# Patient Record
Sex: FEMALE | Race: WHITE | NOT HISPANIC OR LATINO | Employment: OTHER | URBAN - METROPOLITAN AREA
[De-identification: names, ages, dates, MRNs, and addresses within clinical notes are randomized per-mention and may not be internally consistent; named-entity substitution may affect disease eponyms.]

---

## 2017-01-11 ENCOUNTER — TELEPHONE (OUTPATIENT)
Dept: ORTHOPEDIC SURGERY | Facility: CLINIC | Age: 67
End: 2017-01-11

## 2017-01-12 NOTE — TELEPHONE ENCOUNTER
We do not do stem cell procedure here.  She would need to speak with a specialist that performs that procedure to see if she is a candidate

## 2017-06-21 ENCOUNTER — TELEPHONE (OUTPATIENT)
Dept: OBSTETRICS AND GYNECOLOGY | Age: 67
End: 2017-06-21

## 2017-06-21 NOTE — TELEPHONE ENCOUNTER
Please notify the mammogram is the standard of care and can be done with implants.  Breast ultrasound is not done as a diagnostic test to exclude breast cancer.  If patient completely refuses mammogram breast ultrasound alone can be ordered or it can be ordered in conjunction with mammogram.

## 2017-06-22 NOTE — TELEPHONE ENCOUNTER
Pt notd that she can do Mammo here with Implants. She said she doesn't want to be pressed as hard as the mammo presses due to having implants. She said she would rather have U/S done.

## 2017-06-26 NOTE — TELEPHONE ENCOUNTER
I noticed the pt had a mammo 10/10/2016 at our office so she is not due for any screening until after that date

## 2017-10-20 RX ORDER — ALENDRONATE SODIUM 70 MG/1
TABLET ORAL
Qty: 12 TABLET | Refills: 3 | Status: SHIPPED | OUTPATIENT
Start: 2017-10-20 | End: 2017-10-24 | Stop reason: SDUPTHER

## 2017-10-24 ENCOUNTER — TELEPHONE (OUTPATIENT)
Dept: OBSTETRICS AND GYNECOLOGY | Age: 67
End: 2017-10-24

## 2017-10-24 RX ORDER — ALENDRONATE SODIUM 70 MG/1
70 TABLET ORAL
Qty: 12 TABLET | Refills: 0 | Status: SHIPPED | OUTPATIENT
Start: 2017-10-24 | End: 2017-12-14 | Stop reason: SDUPTHER

## 2017-10-24 NOTE — TELEPHONE ENCOUNTER
Dr DUFF pt needs a refill of fosamax 70 mg sent to her [facility] mail order pharmacy, scheduled appt 12-14-17 with dr duff

## 2017-12-14 ENCOUNTER — OFFICE VISIT (OUTPATIENT)
Dept: OBSTETRICS AND GYNECOLOGY | Age: 67
End: 2017-12-14

## 2017-12-14 VITALS
SYSTOLIC BLOOD PRESSURE: 130 MMHG | DIASTOLIC BLOOD PRESSURE: 84 MMHG | HEIGHT: 61 IN | BODY MASS INDEX: 29.83 KG/M2 | WEIGHT: 158 LBS

## 2017-12-14 DIAGNOSIS — M85.80 OSTEOPENIA, UNSPECIFIED LOCATION: ICD-10-CM

## 2017-12-14 DIAGNOSIS — T85.43XD RUPTURE OF IMPLANT OF LEFT BREAST, SUBSEQUENT ENCOUNTER: Primary | ICD-10-CM

## 2017-12-14 DIAGNOSIS — Z01.411 ENCOUNTER FOR GYNECOLOGICAL EXAMINATION WITH ABNORMAL FINDING: ICD-10-CM

## 2017-12-14 PROBLEM — T85.43XA RUPTURE OF IMPLANT OF LEFT BREAST: Status: ACTIVE | Noted: 2017-12-14

## 2017-12-14 PROCEDURE — G0101 CA SCREEN;PELVIC/BREAST EXAM: HCPCS | Performed by: OBSTETRICS & GYNECOLOGY

## 2017-12-14 RX ORDER — UBIDECARENONE 75 MG
50 CAPSULE ORAL
COMMUNITY
End: 2020-06-09

## 2017-12-14 RX ORDER — ALENDRONATE SODIUM 70 MG/1
70 TABLET ORAL
Qty: 12 TABLET | Refills: 3 | Status: SHIPPED | OUTPATIENT
Start: 2017-12-14 | End: 2020-04-08

## 2017-12-14 NOTE — PROGRESS NOTES
Subjective     Chief Complaint   Patient presents with   • Gynecologic Exam     AC       History of Present Illness    Regina Chavez is a 67 y.o.  who presents for annual exam. Patient was found on scant in the past to have a ruptured left breast implant.  Patient was advised to see plastic surgery or breast surgery.  The patient never went and is today refusing a mammogram because she is sure this will worsen the implant rupture.  She may consider going back to see Dr. Thomas    Patient requests an MRI.  She is aware that Medicare does not cover an MRI she may consider paying for herself.    She is doing well on Fosamax for osteopenia.        Obstetric History:  OB History      Para Term  AB Living    2 2 2   2    SAB TAB Ectopic Multiple Live Births        2         Menstrual History:     No LMP recorded. Patient is postmenopausal.         Current contraception: post menopausal status  History of abnormal Pap smear: no  Received Gardasil immunization: no  Perform regular self breast exam: yes  Family history of uterine or ovarian cancer: no  Family History of colon cancer: no  Family history of breast cancer: no    Mammogram: ordered. Pt refused   Colonoscopy: up to date.   DEXA: up to date. Osteopenia on fosamax     Exercise: exercises 7 times a week  Walks a hour   Calcium/Vitamin D: adequate intake    The following portions of the patient's history were reviewed and updated as appropriate: allergies, current medications, past family history, past medical history, past social history, past surgical history and problem list.    Review of Systems    Review of Systems   Constitutional: Negative for fatigue.   Respiratory: Negative for shortness of breath.    Gastrointestinal: Negative for abdominal pain.   Genitourinary: Negative for dysuria.   Neurological: Negative for headaches.   Psychiatric/Behavioral: Negative for dysphoric mood.         Objective   Physical Exam    /84  Ht  "154.9 cm (61\")  Wt 71.7 kg (158 lb)  BMI 29.85 kg/m2    General:   alert, appears stated age, cooperative and mildly obese   Neck: no asymmetry, masses, or scars and thyroid normal to palpation   Heart: regular rate and rhythm   Lungs: clear to auscultation bilaterally   Abdomen: soft, non-tender, without masses or organomegaly   Breast: inspection negative, no nipple discharge or bleeding, no masses or nodularity palpable   Vulva: normal, Bartholin's, Urethra, [place]'s normal   Vagina: normal mucosa, normal discharge   Cervix: no cervical motion tenderness and no lesions   Uterus: mobile, non-tender, normal shape and consistency   Adnexa: no mass, fullness, tenderness   Rectal: normal rectal, no masses     Assessment/Plan   [place] was seen today for gynecologic exam.    Diagnoses and all orders for this visit:    Rupture of implant of left breast, subsequent encounter  -     Ambulatory Referral to Plastic Surgery    Osteopenia, unspecified location    Encounter for gynecological examination with abnormal finding    Other orders  -     alendronate (FOSAMAX) 70 MG tablet; Take 1 tablet by mouth Every 7 (Seven) Days.    I ties the patient that I recommend she see a plastic surgeon to have the implant removed.  She seems very ambivalent about this.  I did refer her to a plastic surgeon.  I ordered an MRI but I do not think it will go through due to Medicare.  I encouraged the patient to have a mammogram.  She declined.  All questions answered.  Breast self exam technique reviewed and patient encouraged to perform self-exam monthly.  Discussed healthy lifestyle modifications.  Recommended 30 minutes of aerobic exercise five times per week.  Discussed calcium needs to prevent osteoporosis.                 "

## 2017-12-15 ENCOUNTER — TELEPHONE (OUTPATIENT)
Dept: OBSTETRICS AND GYNECOLOGY | Age: 67
End: 2017-12-15

## 2017-12-15 NOTE — TELEPHONE ENCOUNTER
Dr DUFF pt  (who is aware will not see msg til Monday)  had a dexa scan last year pt stated she had osteopenia, does Dr DUFF want her to have dexa this year?

## 2018-09-26 ENCOUNTER — APPOINTMENT (OUTPATIENT)
Dept: GENERAL RADIOLOGY | Facility: HOSPITAL | Age: 68
End: 2018-09-26

## 2018-09-26 ENCOUNTER — APPOINTMENT (OUTPATIENT)
Dept: CT IMAGING | Facility: HOSPITAL | Age: 68
End: 2018-09-26

## 2018-09-26 ENCOUNTER — HOSPITAL ENCOUNTER (INPATIENT)
Facility: HOSPITAL | Age: 68
LOS: 6 days | Discharge: HOME HEALTH | End: 2018-10-02
Attending: EMERGENCY MEDICINE | Admitting: NEUROLOGICAL SURGERY
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
## 2018-09-26 DIAGNOSIS — R26.2 DIFFICULTY WALKING: ICD-10-CM

## 2018-09-26 DIAGNOSIS — S22.000A COMPRESSION FRACTURE OF BODY OF THORACIC VERTEBRA (HCC): ICD-10-CM

## 2018-09-26 DIAGNOSIS — M85.89 OSTEOPENIA OF MULTIPLE SITES: ICD-10-CM

## 2018-09-26 DIAGNOSIS — S22.080D CLOSED WEDGE COMPRESSION FRACTURE OF TWELFTH THORACIC VERTEBRA WITH ROUTINE HEALING, SUBSEQUENT ENCOUNTER: ICD-10-CM

## 2018-09-26 DIAGNOSIS — S51.812A FOREARM LACERATION, LEFT, INITIAL ENCOUNTER: ICD-10-CM

## 2018-09-26 PROBLEM — S22.009A FRACTURE OF THORACIC SPINE: Status: ACTIVE | Noted: 2018-09-26

## 2018-09-26 PROBLEM — F10.20 ALCOHOL DEPENDENCY: Status: ACTIVE | Noted: 2018-09-26

## 2018-09-26 PROBLEM — S22.089A CLOSED FRACTURE OF TWELFTH THORACIC VERTEBRA: Status: ACTIVE | Noted: 2018-09-26

## 2018-09-26 PROBLEM — E87.6 HYPOKALEMIA: Status: ACTIVE | Noted: 2018-09-26

## 2018-09-26 LAB
ALBUMIN SERPL-MCNC: 3.7 G/DL (ref 3.5–5.2)
ALBUMIN/GLOB SERPL: 1.6 G/DL
ALP SERPL-CCNC: 66 U/L (ref 39–117)
ALT SERPL W P-5'-P-CCNC: 42 U/L (ref 1–33)
ANION GAP SERPL CALCULATED.3IONS-SCNC: 12.4 MMOL/L
ANION GAP SERPL CALCULATED.3IONS-SCNC: 17.8 MMOL/L
APTT PPP: 29 SECONDS (ref 22.7–35.4)
AST SERPL-CCNC: 35 U/L (ref 1–32)
BASOPHILS # BLD AUTO: 0.01 10*3/MM3 (ref 0–0.2)
BASOPHILS NFR BLD AUTO: 0.1 % (ref 0–1.5)
BILIRUB SERPL-MCNC: 0.6 MG/DL (ref 0.1–1.2)
BUN BLD-MCNC: 16 MG/DL (ref 8–23)
BUN BLD-MCNC: 17 MG/DL (ref 8–23)
BUN/CREAT SERPL: 22.5 (ref 7–25)
BUN/CREAT SERPL: 23.9 (ref 7–25)
CALCIUM SPEC-SCNC: 8.4 MG/DL (ref 8.6–10.5)
CALCIUM SPEC-SCNC: 8.6 MG/DL (ref 8.6–10.5)
CHLORIDE SERPL-SCNC: 105 MMOL/L (ref 98–107)
CHLORIDE SERPL-SCNC: 109 MMOL/L (ref 98–107)
CO2 SERPL-SCNC: 17.2 MMOL/L (ref 22–29)
CO2 SERPL-SCNC: 24.6 MMOL/L (ref 22–29)
CREAT BLD-MCNC: 0.71 MG/DL (ref 0.57–1)
CREAT BLD-MCNC: 0.71 MG/DL (ref 0.57–1)
DEPRECATED RDW RBC AUTO: 47.2 FL (ref 37–54)
EOSINOPHIL # BLD AUTO: 0 10*3/MM3 (ref 0–0.7)
EOSINOPHIL NFR BLD AUTO: 0 % (ref 0.3–6.2)
ERYTHROCYTE [DISTWIDTH] IN BLOOD BY AUTOMATED COUNT: 13.3 % (ref 11.7–13)
ETHANOL BLD-MCNC: 67 MG/DL (ref 0–10)
ETHANOL UR QL: 0.07 %
GFR SERPL CREATININE-BSD FRML MDRD: 81 ML/MIN/1.73
GFR SERPL CREATININE-BSD FRML MDRD: 81 ML/MIN/1.73
GLOBULIN UR ELPH-MCNC: 2.3 GM/DL
GLUCOSE BLD-MCNC: 86 MG/DL (ref 65–99)
GLUCOSE BLD-MCNC: 98 MG/DL (ref 65–99)
HCT VFR BLD AUTO: 38.8 % (ref 35.6–45.5)
HGB BLD-MCNC: 12.7 G/DL (ref 11.9–15.5)
IMM GRANULOCYTES # BLD: 0.04 10*3/MM3 (ref 0–0.03)
IMM GRANULOCYTES NFR BLD: 0.5 % (ref 0–0.5)
INR PPP: 1.08 (ref 0.9–1.1)
LYMPHOCYTES # BLD AUTO: 0.61 10*3/MM3 (ref 0.9–4.8)
LYMPHOCYTES NFR BLD AUTO: 7.2 % (ref 19.6–45.3)
MCH RBC QN AUTO: 31.9 PG (ref 26.9–32)
MCHC RBC AUTO-ENTMCNC: 32.7 G/DL (ref 32.4–36.3)
MCV RBC AUTO: 97.5 FL (ref 80.5–98.2)
MONOCYTES # BLD AUTO: 0.54 10*3/MM3 (ref 0.2–1.2)
MONOCYTES NFR BLD AUTO: 6.4 % (ref 5–12)
NEUTROPHILS # BLD AUTO: 7.27 10*3/MM3 (ref 1.9–8.1)
NEUTROPHILS NFR BLD AUTO: 85.8 % (ref 42.7–76)
PLATELET # BLD AUTO: 168 10*3/MM3 (ref 140–500)
PMV BLD AUTO: 10.1 FL (ref 6–12)
POTASSIUM BLD-SCNC: 3.1 MMOL/L (ref 3.5–5.2)
POTASSIUM BLD-SCNC: 3.5 MMOL/L (ref 3.5–5.2)
PROT SERPL-MCNC: 6 G/DL (ref 6–8.5)
PROTHROMBIN TIME: 13.8 SECONDS (ref 11.7–14.2)
RBC # BLD AUTO: 3.98 10*6/MM3 (ref 3.9–5.2)
SODIUM BLD-SCNC: 142 MMOL/L (ref 136–145)
SODIUM BLD-SCNC: 144 MMOL/L (ref 136–145)
WBC NRBC COR # BLD: 8.47 10*3/MM3 (ref 4.5–10.7)

## 2018-09-26 PROCEDURE — 80307 DRUG TEST PRSMV CHEM ANLYZR: CPT | Performed by: EMERGENCY MEDICINE

## 2018-09-26 PROCEDURE — 70450 CT HEAD/BRAIN W/O DYE: CPT

## 2018-09-26 PROCEDURE — 87186 SC STD MICRODIL/AGAR DIL: CPT | Performed by: INTERNAL MEDICINE

## 2018-09-26 PROCEDURE — 80053 COMPREHEN METABOLIC PANEL: CPT | Performed by: EMERGENCY MEDICINE

## 2018-09-26 PROCEDURE — 87088 URINE BACTERIA CULTURE: CPT | Performed by: INTERNAL MEDICINE

## 2018-09-26 PROCEDURE — 87086 URINE CULTURE/COLONY COUNT: CPT | Performed by: INTERNAL MEDICINE

## 2018-09-26 PROCEDURE — 85610 PROTHROMBIN TIME: CPT | Performed by: NEUROLOGICAL SURGERY

## 2018-09-26 PROCEDURE — 72110 X-RAY EXAM L-2 SPINE 4/>VWS: CPT

## 2018-09-26 PROCEDURE — 25010000002 ONDANSETRON PER 1 MG: Performed by: EMERGENCY MEDICINE

## 2018-09-26 PROCEDURE — 81001 URINALYSIS AUTO W/SCOPE: CPT | Performed by: INTERNAL MEDICINE

## 2018-09-26 PROCEDURE — 25010000002 HYDROMORPHONE PER 4 MG: Performed by: EMERGENCY MEDICINE

## 2018-09-26 PROCEDURE — 72170 X-RAY EXAM OF PELVIS: CPT

## 2018-09-26 PROCEDURE — 25010000002 THIAMINE PER 100 MG: Performed by: INTERNAL MEDICINE

## 2018-09-26 PROCEDURE — 99285 EMERGENCY DEPT VISIT HI MDM: CPT

## 2018-09-26 PROCEDURE — 72072 X-RAY EXAM THORAC SPINE 3VWS: CPT

## 2018-09-26 PROCEDURE — 0HQEXZZ REPAIR LEFT LOWER ARM SKIN, EXTERNAL APPROACH: ICD-10-PCS | Performed by: EMERGENCY MEDICINE

## 2018-09-26 PROCEDURE — 90791 PSYCH DIAGNOSTIC EVALUATION: CPT | Performed by: SOCIAL WORKER

## 2018-09-26 PROCEDURE — 85025 COMPLETE CBC W/AUTO DIFF WBC: CPT | Performed by: EMERGENCY MEDICINE

## 2018-09-26 PROCEDURE — 25010000002 HYDROMORPHONE PER 4 MG: Performed by: INTERNAL MEDICINE

## 2018-09-26 PROCEDURE — 71045 X-RAY EXAM CHEST 1 VIEW: CPT

## 2018-09-26 PROCEDURE — 25010000002 KETOROLAC TROMETHAMINE PER 15 MG: Performed by: INTERNAL MEDICINE

## 2018-09-26 PROCEDURE — 85730 THROMBOPLASTIN TIME PARTIAL: CPT | Performed by: NEUROLOGICAL SURGERY

## 2018-09-26 PROCEDURE — 99204 OFFICE O/P NEW MOD 45 MIN: CPT | Performed by: NURSE PRACTITIONER

## 2018-09-26 RX ORDER — LOSARTAN POTASSIUM 100 MG/1
100 TABLET ORAL DAILY
Status: DISCONTINUED | OUTPATIENT
Start: 2018-09-26 | End: 2018-10-02 | Stop reason: HOSPADM

## 2018-09-26 RX ORDER — LIDOCAINE HYDROCHLORIDE AND EPINEPHRINE 10; 10 MG/ML; UG/ML
10 INJECTION, SOLUTION INFILTRATION; PERINEURAL ONCE
Status: DISCONTINUED | OUTPATIENT
Start: 2018-09-26 | End: 2018-10-02

## 2018-09-26 RX ORDER — SODIUM CHLORIDE 0.9 % (FLUSH) 0.9 %
10 SYRINGE (ML) INJECTION AS NEEDED
Status: DISCONTINUED | OUTPATIENT
Start: 2018-09-26 | End: 2018-10-02 | Stop reason: HOSPADM

## 2018-09-26 RX ORDER — ONDANSETRON 4 MG/1
4 TABLET, ORALLY DISINTEGRATING ORAL ONCE
Status: DISCONTINUED | OUTPATIENT
Start: 2018-09-26 | End: 2018-09-26

## 2018-09-26 RX ORDER — LORAZEPAM 2 MG/ML
0.5 INJECTION INTRAMUSCULAR EVERY 6 HOURS
Status: DISPENSED | OUTPATIENT
Start: 2018-09-26 | End: 2018-09-27

## 2018-09-26 RX ORDER — POTASSIUM CHLORIDE 750 MG/1
40 CAPSULE, EXTENDED RELEASE ORAL AS NEEDED
Status: DISCONTINUED | OUTPATIENT
Start: 2018-09-26 | End: 2018-10-02 | Stop reason: HOSPADM

## 2018-09-26 RX ORDER — CEFAZOLIN SODIUM 2 G/100ML
2 INJECTION, SOLUTION INTRAVENOUS ONCE
Status: CANCELLED | OUTPATIENT
Start: 2018-09-27 | End: 2018-09-27

## 2018-09-26 RX ORDER — POTASSIUM CHLORIDE 1.5 G/1.77G
40 POWDER, FOR SOLUTION ORAL AS NEEDED
Status: DISCONTINUED | OUTPATIENT
Start: 2018-09-26 | End: 2018-10-02 | Stop reason: HOSPADM

## 2018-09-26 RX ORDER — DOCUSATE SODIUM 100 MG/1
100 CAPSULE, LIQUID FILLED ORAL 2 TIMES DAILY
Status: DISCONTINUED | OUTPATIENT
Start: 2018-09-26 | End: 2018-10-02 | Stop reason: HOSPADM

## 2018-09-26 RX ORDER — KETOROLAC TROMETHAMINE 30 MG/ML
30 INJECTION, SOLUTION INTRAMUSCULAR; INTRAVENOUS EVERY 4 HOURS PRN
Status: DISCONTINUED | OUTPATIENT
Start: 2018-09-26 | End: 2018-09-30

## 2018-09-26 RX ORDER — ONDANSETRON 4 MG/1
4 TABLET, FILM COATED ORAL EVERY 6 HOURS PRN
Status: DISCONTINUED | OUTPATIENT
Start: 2018-09-26 | End: 2018-10-02 | Stop reason: HOSPADM

## 2018-09-26 RX ORDER — SODIUM CHLORIDE 0.9 % (FLUSH) 0.9 %
1-10 SYRINGE (ML) INJECTION AS NEEDED
Status: CANCELLED | OUTPATIENT
Start: 2018-09-27

## 2018-09-26 RX ORDER — SODIUM CHLORIDE 9 MG/ML
100 INJECTION, SOLUTION INTRAVENOUS CONTINUOUS
Status: DISCONTINUED | OUTPATIENT
Start: 2018-09-26 | End: 2018-09-26

## 2018-09-26 RX ORDER — CALCIUM CARBONATE 500(1250)
500 TABLET ORAL DAILY
Status: DISCONTINUED | OUTPATIENT
Start: 2018-09-27 | End: 2018-10-02 | Stop reason: HOSPADM

## 2018-09-26 RX ORDER — ONDANSETRON 2 MG/ML
4 INJECTION INTRAMUSCULAR; INTRAVENOUS ONCE
Status: COMPLETED | OUTPATIENT
Start: 2018-09-26 | End: 2018-09-26

## 2018-09-26 RX ORDER — ONDANSETRON 4 MG/1
4 TABLET, ORALLY DISINTEGRATING ORAL EVERY 6 HOURS PRN
Status: DISCONTINUED | OUTPATIENT
Start: 2018-09-26 | End: 2018-10-02 | Stop reason: HOSPADM

## 2018-09-26 RX ORDER — SODIUM CHLORIDE 0.9 % (FLUSH) 0.9 %
1-10 SYRINGE (ML) INJECTION AS NEEDED
Status: DISCONTINUED | OUTPATIENT
Start: 2018-09-26 | End: 2018-10-02 | Stop reason: HOSPADM

## 2018-09-26 RX ORDER — AMLODIPINE BESYLATE 5 MG/1
5 TABLET ORAL DAILY
Status: DISCONTINUED | OUTPATIENT
Start: 2018-09-26 | End: 2018-10-02 | Stop reason: HOSPADM

## 2018-09-26 RX ORDER — ONDANSETRON 2 MG/ML
4 INJECTION INTRAMUSCULAR; INTRAVENOUS EVERY 6 HOURS PRN
Status: DISCONTINUED | OUTPATIENT
Start: 2018-09-26 | End: 2018-10-02 | Stop reason: HOSPADM

## 2018-09-26 RX ORDER — UREA 10 %
1250 LOTION (ML) TOPICAL DAILY
Status: DISCONTINUED | OUTPATIENT
Start: 2018-09-26 | End: 2018-09-26 | Stop reason: CLARIF

## 2018-09-26 RX ORDER — LORAZEPAM 2 MG/ML
0.5 INJECTION INTRAMUSCULAR EVERY 8 HOURS
Status: DISCONTINUED | OUTPATIENT
Start: 2018-09-27 | End: 2018-10-02 | Stop reason: HOSPADM

## 2018-09-26 RX ORDER — NALOXONE HCL 0.4 MG/ML
0.4 VIAL (ML) INJECTION
Status: DISCONTINUED | OUTPATIENT
Start: 2018-09-26 | End: 2018-10-02 | Stop reason: HOSPADM

## 2018-09-26 RX ADMIN — FOLIC ACID 100 ML/HR: 5 INJECTION, SOLUTION INTRAMUSCULAR; INTRAVENOUS; SUBCUTANEOUS at 20:47

## 2018-09-26 RX ADMIN — HYDROMORPHONE HYDROCHLORIDE 0.5 MG: 1 INJECTION, SOLUTION INTRAMUSCULAR; INTRAVENOUS; SUBCUTANEOUS at 09:48

## 2018-09-26 RX ADMIN — Medication 10 ML: at 09:48

## 2018-09-26 RX ADMIN — HYDROMORPHONE HYDROCHLORIDE 1 MG: 1 INJECTION, SOLUTION INTRAMUSCULAR; INTRAVENOUS; SUBCUTANEOUS at 23:10

## 2018-09-26 RX ADMIN — LOSARTAN POTASSIUM 100 MG: 100 TABLET, FILM COATED ORAL at 21:30

## 2018-09-26 RX ADMIN — HYDROMORPHONE HYDROCHLORIDE 1 MG: 1 INJECTION, SOLUTION INTRAMUSCULAR; INTRAVENOUS; SUBCUTANEOUS at 18:25

## 2018-09-26 RX ADMIN — SODIUM CHLORIDE 100 ML/HR: 9 INJECTION, SOLUTION INTRAVENOUS at 14:38

## 2018-09-26 RX ADMIN — ONDANSETRON 4 MG: 2 INJECTION INTRAMUSCULAR; INTRAVENOUS at 09:48

## 2018-09-26 RX ADMIN — KETOROLAC TROMETHAMINE 30 MG: 30 INJECTION, SOLUTION INTRAMUSCULAR at 14:38

## 2018-09-26 RX ADMIN — HYDROMORPHONE HYDROCHLORIDE 0.5 MG: 1 INJECTION, SOLUTION INTRAMUSCULAR; INTRAVENOUS; SUBCUTANEOUS at 10:30

## 2018-09-26 RX ADMIN — AMLODIPINE BESYLATE 5 MG: 5 TABLET ORAL at 21:30

## 2018-09-26 RX ADMIN — DOCUSATE SODIUM 100 MG: 100 CAPSULE, LIQUID FILLED ORAL at 21:30

## 2018-09-27 PROBLEM — N39.0 UTI (URINARY TRACT INFECTION): Status: ACTIVE | Noted: 2018-09-27

## 2018-09-27 LAB
ANION GAP SERPL CALCULATED.3IONS-SCNC: 10.4 MMOL/L
BACTERIA UR QL AUTO: ABNORMAL /HPF
BACTERIA UR QL AUTO: ABNORMAL /HPF
BILIRUB UR QL STRIP: NEGATIVE
BILIRUB UR QL STRIP: NEGATIVE
BUN BLD-MCNC: 12 MG/DL (ref 8–23)
BUN/CREAT SERPL: 19 (ref 7–25)
CALCIUM SPEC-SCNC: 8.7 MG/DL (ref 8.6–10.5)
CHLORIDE SERPL-SCNC: 107 MMOL/L (ref 98–107)
CLARITY UR: ABNORMAL
CLARITY UR: ABNORMAL
CO2 SERPL-SCNC: 21.6 MMOL/L (ref 22–29)
COLOR UR: YELLOW
COLOR UR: YELLOW
CREAT BLD-MCNC: 0.63 MG/DL (ref 0.57–1)
GFR SERPL CREATININE-BSD FRML MDRD: 93 ML/MIN/1.73
GLUCOSE BLD-MCNC: 109 MG/DL (ref 65–99)
GLUCOSE UR STRIP-MCNC: NEGATIVE MG/DL
GLUCOSE UR STRIP-MCNC: NEGATIVE MG/DL
HGB UR QL STRIP.AUTO: ABNORMAL
HGB UR QL STRIP.AUTO: ABNORMAL
HYALINE CASTS UR QL AUTO: ABNORMAL /LPF
HYALINE CASTS UR QL AUTO: ABNORMAL /LPF
INR PPP: 1.03 (ref 0.9–1.1)
KETONES UR QL STRIP: NEGATIVE
KETONES UR QL STRIP: NEGATIVE
LEUKOCYTE ESTERASE UR QL STRIP.AUTO: ABNORMAL
LEUKOCYTE ESTERASE UR QL STRIP.AUTO: ABNORMAL
NITRITE UR QL STRIP: POSITIVE
NITRITE UR QL STRIP: POSITIVE
PH UR STRIP.AUTO: 7 [PH] (ref 5–8)
PH UR STRIP.AUTO: 7 [PH] (ref 5–8)
POTASSIUM BLD-SCNC: 3.8 MMOL/L (ref 3.5–5.2)
PROT UR QL STRIP: ABNORMAL
PROT UR QL STRIP: ABNORMAL
PROTHROMBIN TIME: 13.3 SECONDS (ref 11.7–14.2)
RBC # UR: ABNORMAL /HPF
RBC # UR: ABNORMAL /HPF
REF LAB TEST METHOD: ABNORMAL
REF LAB TEST METHOD: ABNORMAL
SODIUM BLD-SCNC: 139 MMOL/L (ref 136–145)
SP GR UR STRIP: 1.02 (ref 1–1.03)
SP GR UR STRIP: 1.02 (ref 1–1.03)
SQUAMOUS #/AREA URNS HPF: ABNORMAL /HPF
SQUAMOUS #/AREA URNS HPF: ABNORMAL /HPF
UROBILINOGEN UR QL STRIP: ABNORMAL
UROBILINOGEN UR QL STRIP: ABNORMAL
WBC UR QL AUTO: ABNORMAL /HPF
WBC UR QL AUTO: ABNORMAL /HPF

## 2018-09-27 PROCEDURE — 80048 BASIC METABOLIC PNL TOTAL CA: CPT | Performed by: INTERNAL MEDICINE

## 2018-09-27 PROCEDURE — 25010000002 LORAZEPAM PER 2 MG: Performed by: INTERNAL MEDICINE

## 2018-09-27 PROCEDURE — 25010000002 KETOROLAC TROMETHAMINE PER 15 MG: Performed by: INTERNAL MEDICINE

## 2018-09-27 PROCEDURE — 25010000002 HYDROMORPHONE PER 4 MG: Performed by: INTERNAL MEDICINE

## 2018-09-27 PROCEDURE — 25010000002 CEFTRIAXONE PER 250 MG: Performed by: NURSE PRACTITIONER

## 2018-09-27 PROCEDURE — 25010000002 THIAMINE PER 100 MG: Performed by: INTERNAL MEDICINE

## 2018-09-27 PROCEDURE — 81001 URINALYSIS AUTO W/SCOPE: CPT | Performed by: HOSPITALIST

## 2018-09-27 PROCEDURE — 85610 PROTHROMBIN TIME: CPT | Performed by: NURSE PRACTITIONER

## 2018-09-27 RX ORDER — METHOCARBAMOL 500 MG/1
500 TABLET, FILM COATED ORAL EVERY 6 HOURS PRN
Status: DISCONTINUED | OUTPATIENT
Start: 2018-09-27 | End: 2018-10-02 | Stop reason: HOSPADM

## 2018-09-27 RX ORDER — HYDROMORPHONE HYDROCHLORIDE 1 MG/ML
0.5 INJECTION, SOLUTION INTRAMUSCULAR; INTRAVENOUS; SUBCUTANEOUS EVERY 4 HOURS PRN
Status: DISCONTINUED | OUTPATIENT
Start: 2018-09-27 | End: 2018-10-02 | Stop reason: HOSPADM

## 2018-09-27 RX ORDER — HYDROCODONE BITARTRATE AND ACETAMINOPHEN 7.5; 325 MG/1; MG/1
1 TABLET ORAL EVERY 4 HOURS PRN
Status: DISCONTINUED | OUTPATIENT
Start: 2018-09-27 | End: 2018-10-02 | Stop reason: HOSPADM

## 2018-09-27 RX ORDER — CEFTRIAXONE SODIUM 1 G/50ML
1 INJECTION, SOLUTION INTRAVENOUS EVERY 24 HOURS
Status: DISCONTINUED | OUTPATIENT
Start: 2018-09-27 | End: 2018-10-02

## 2018-09-27 RX ADMIN — DOCUSATE SODIUM 100 MG: 100 CAPSULE, LIQUID FILLED ORAL at 20:15

## 2018-09-27 RX ADMIN — KETOROLAC TROMETHAMINE 30 MG: 30 INJECTION, SOLUTION INTRAMUSCULAR at 20:14

## 2018-09-27 RX ADMIN — LORAZEPAM 0.5 MG: 2 INJECTION INTRAMUSCULAR; INTRAVENOUS at 00:51

## 2018-09-27 RX ADMIN — HYDROMORPHONE HYDROCHLORIDE 1 MG: 1 INJECTION, SOLUTION INTRAMUSCULAR; INTRAVENOUS; SUBCUTANEOUS at 03:17

## 2018-09-27 RX ADMIN — HYDROCODONE BITARTRATE AND ACETAMINOPHEN 1 TABLET: 7.5; 325 TABLET ORAL at 15:10

## 2018-09-27 RX ADMIN — HYDROMORPHONE HYDROCHLORIDE 1 MG: 1 INJECTION, SOLUTION INTRAMUSCULAR; INTRAVENOUS; SUBCUTANEOUS at 07:40

## 2018-09-27 RX ADMIN — METHOCARBAMOL 500 MG: 500 TABLET ORAL at 17:32

## 2018-09-27 RX ADMIN — CALCIUM 500 MG: 500 TABLET ORAL at 09:25

## 2018-09-27 RX ADMIN — FOLIC ACID 100 ML/HR: 5 INJECTION, SOLUTION INTRAMUSCULAR; INTRAVENOUS; SUBCUTANEOUS at 07:40

## 2018-09-27 RX ADMIN — HYDROMORPHONE HYDROCHLORIDE 1 MG: 1 INJECTION, SOLUTION INTRAMUSCULAR; INTRAVENOUS; SUBCUTANEOUS at 12:06

## 2018-09-27 RX ADMIN — LOSARTAN POTASSIUM 100 MG: 100 TABLET, FILM COATED ORAL at 09:24

## 2018-09-27 RX ADMIN — FOLIC ACID 100 ML/HR: 5 INJECTION, SOLUTION INTRAMUSCULAR; INTRAVENOUS; SUBCUTANEOUS at 20:14

## 2018-09-27 RX ADMIN — LORAZEPAM 0.5 MG: 2 INJECTION INTRAMUSCULAR; INTRAVENOUS at 06:13

## 2018-09-27 RX ADMIN — CEFTRIAXONE SODIUM 1 G: 1 INJECTION, SOLUTION INTRAVENOUS at 05:36

## 2018-09-27 RX ADMIN — AMLODIPINE BESYLATE 5 MG: 5 TABLET ORAL at 09:24

## 2018-09-27 RX ADMIN — LORAZEPAM 0.5 MG: 2 INJECTION INTRAMUSCULAR; INTRAVENOUS at 23:51

## 2018-09-27 RX ADMIN — LORAZEPAM 0.5 MG: 2 INJECTION INTRAMUSCULAR; INTRAVENOUS at 15:10

## 2018-09-27 RX ADMIN — DOCUSATE SODIUM 100 MG: 100 CAPSULE, LIQUID FILLED ORAL at 09:25

## 2018-09-28 LAB
ANION GAP SERPL CALCULATED.3IONS-SCNC: 8.1 MMOL/L
BUN BLD-MCNC: 10 MG/DL (ref 8–23)
BUN/CREAT SERPL: 16.9 (ref 7–25)
CALCIUM SPEC-SCNC: 8.9 MG/DL (ref 8.6–10.5)
CHLORIDE SERPL-SCNC: 107 MMOL/L (ref 98–107)
CO2 SERPL-SCNC: 23.9 MMOL/L (ref 22–29)
CREAT BLD-MCNC: 0.59 MG/DL (ref 0.57–1)
GFR SERPL CREATININE-BSD FRML MDRD: 101 ML/MIN/1.73
GLUCOSE BLD-MCNC: 107 MG/DL (ref 65–99)
POTASSIUM BLD-SCNC: 3.7 MMOL/L (ref 3.5–5.2)
SODIUM BLD-SCNC: 139 MMOL/L (ref 136–145)

## 2018-09-28 PROCEDURE — 25010000002 KETOROLAC TROMETHAMINE PER 15 MG: Performed by: INTERNAL MEDICINE

## 2018-09-28 PROCEDURE — 97161 PT EVAL LOW COMPLEX 20 MIN: CPT

## 2018-09-28 PROCEDURE — 25010000002 LORAZEPAM PER 2 MG: Performed by: INTERNAL MEDICINE

## 2018-09-28 PROCEDURE — 25010000002 CEFTRIAXONE PER 250 MG: Performed by: NURSE PRACTITIONER

## 2018-09-28 PROCEDURE — 97110 THERAPEUTIC EXERCISES: CPT

## 2018-09-28 PROCEDURE — 99231 SBSQ HOSP IP/OBS SF/LOW 25: CPT | Performed by: NURSE PRACTITIONER

## 2018-09-28 PROCEDURE — 80048 BASIC METABOLIC PNL TOTAL CA: CPT | Performed by: INTERNAL MEDICINE

## 2018-09-28 RX ADMIN — HYDROCODONE BITARTRATE AND ACETAMINOPHEN 1 TABLET: 7.5; 325 TABLET ORAL at 11:16

## 2018-09-28 RX ADMIN — DOCUSATE SODIUM 100 MG: 100 CAPSULE, LIQUID FILLED ORAL at 20:47

## 2018-09-28 RX ADMIN — HYDROCODONE BITARTRATE AND ACETAMINOPHEN 1 TABLET: 7.5; 325 TABLET ORAL at 15:34

## 2018-09-28 RX ADMIN — LOSARTAN POTASSIUM 100 MG: 100 TABLET, FILM COATED ORAL at 09:14

## 2018-09-28 RX ADMIN — HYDROCODONE BITARTRATE AND ACETAMINOPHEN 1 TABLET: 7.5; 325 TABLET ORAL at 05:51

## 2018-09-28 RX ADMIN — LORAZEPAM 0.5 MG: 2 INJECTION INTRAMUSCULAR; INTRAVENOUS at 09:14

## 2018-09-28 RX ADMIN — METHOCARBAMOL 500 MG: 500 TABLET ORAL at 23:06

## 2018-09-28 RX ADMIN — HYDROCODONE BITARTRATE AND ACETAMINOPHEN 1 TABLET: 7.5; 325 TABLET ORAL at 20:47

## 2018-09-28 RX ADMIN — DOCUSATE SODIUM 100 MG: 100 CAPSULE, LIQUID FILLED ORAL at 09:14

## 2018-09-28 RX ADMIN — KETOROLAC TROMETHAMINE 30 MG: 30 INJECTION, SOLUTION INTRAMUSCULAR at 08:11

## 2018-09-28 RX ADMIN — LORAZEPAM 0.5 MG: 2 INJECTION INTRAMUSCULAR; INTRAVENOUS at 23:06

## 2018-09-28 RX ADMIN — AMLODIPINE BESYLATE 5 MG: 5 TABLET ORAL at 09:14

## 2018-09-28 RX ADMIN — CEFTRIAXONE SODIUM 1 G: 1 INJECTION, SOLUTION INTRAVENOUS at 05:52

## 2018-09-28 RX ADMIN — CALCIUM 500 MG: 500 TABLET ORAL at 09:14

## 2018-09-29 LAB
ANION GAP SERPL CALCULATED.3IONS-SCNC: 9.2 MMOL/L
BACTERIA SPEC AEROBE CULT: ABNORMAL
BUN BLD-MCNC: 13 MG/DL (ref 8–23)
BUN/CREAT SERPL: 20.6 (ref 7–25)
CALCIUM SPEC-SCNC: 9.4 MG/DL (ref 8.6–10.5)
CHLORIDE SERPL-SCNC: 108 MMOL/L (ref 98–107)
CO2 SERPL-SCNC: 22.8 MMOL/L (ref 22–29)
CREAT BLD-MCNC: 0.63 MG/DL (ref 0.57–1)
GFR SERPL CREATININE-BSD FRML MDRD: 93 ML/MIN/1.73
GLUCOSE BLD-MCNC: 103 MG/DL (ref 65–99)
POTASSIUM BLD-SCNC: 3.6 MMOL/L (ref 3.5–5.2)
SODIUM BLD-SCNC: 140 MMOL/L (ref 136–145)

## 2018-09-29 PROCEDURE — 97110 THERAPEUTIC EXERCISES: CPT

## 2018-09-29 PROCEDURE — 80048 BASIC METABOLIC PNL TOTAL CA: CPT | Performed by: INTERNAL MEDICINE

## 2018-09-29 PROCEDURE — 25010000002 CEFTRIAXONE PER 250 MG: Performed by: NURSE PRACTITIONER

## 2018-09-29 PROCEDURE — 25010000002 LORAZEPAM PER 2 MG: Performed by: INTERNAL MEDICINE

## 2018-09-29 PROCEDURE — 99231 SBSQ HOSP IP/OBS SF/LOW 25: CPT | Performed by: NEUROLOGICAL SURGERY

## 2018-09-29 RX ADMIN — METHOCARBAMOL 500 MG: 500 TABLET ORAL at 16:49

## 2018-09-29 RX ADMIN — CEFTRIAXONE SODIUM 1 G: 1 INJECTION, SOLUTION INTRAVENOUS at 05:36

## 2018-09-29 RX ADMIN — DOCUSATE SODIUM 100 MG: 100 CAPSULE, LIQUID FILLED ORAL at 09:59

## 2018-09-29 RX ADMIN — HYDROCODONE BITARTRATE AND ACETAMINOPHEN 1 TABLET: 7.5; 325 TABLET ORAL at 21:37

## 2018-09-29 RX ADMIN — METHOCARBAMOL 500 MG: 500 TABLET ORAL at 09:59

## 2018-09-29 RX ADMIN — HYDROCODONE BITARTRATE AND ACETAMINOPHEN 1 TABLET: 7.5; 325 TABLET ORAL at 09:59

## 2018-09-29 RX ADMIN — HYDROCODONE BITARTRATE AND ACETAMINOPHEN 1 TABLET: 7.5; 325 TABLET ORAL at 13:53

## 2018-09-29 RX ADMIN — LORAZEPAM 0.5 MG: 2 INJECTION INTRAMUSCULAR; INTRAVENOUS at 05:36

## 2018-09-29 RX ADMIN — LOSARTAN POTASSIUM 100 MG: 100 TABLET, FILM COATED ORAL at 09:59

## 2018-09-29 RX ADMIN — DOCUSATE SODIUM 100 MG: 100 CAPSULE, LIQUID FILLED ORAL at 21:37

## 2018-09-29 RX ADMIN — HYDROCODONE BITARTRATE AND ACETAMINOPHEN 1 TABLET: 7.5; 325 TABLET ORAL at 05:36

## 2018-09-29 RX ADMIN — AMLODIPINE BESYLATE 5 MG: 5 TABLET ORAL at 09:59

## 2018-09-29 RX ADMIN — CALCIUM 500 MG: 500 TABLET ORAL at 09:59

## 2018-09-29 RX ADMIN — LORAZEPAM 0.5 MG: 2 INJECTION INTRAMUSCULAR; INTRAVENOUS at 21:37

## 2018-09-29 RX ADMIN — LORAZEPAM 0.5 MG: 2 INJECTION INTRAMUSCULAR; INTRAVENOUS at 16:50

## 2018-09-30 LAB
ANION GAP SERPL CALCULATED.3IONS-SCNC: 14.6 MMOL/L
BACTERIA UR QL AUTO: ABNORMAL /HPF
BASOPHILS # BLD AUTO: 0.01 10*3/MM3 (ref 0–0.2)
BASOPHILS NFR BLD AUTO: 0.2 % (ref 0–1.5)
BILIRUB UR QL STRIP: NEGATIVE
BUN BLD-MCNC: 23 MG/DL (ref 8–23)
BUN/CREAT SERPL: 32.9 (ref 7–25)
CALCIUM SPEC-SCNC: 9.4 MG/DL (ref 8.6–10.5)
CHLORIDE SERPL-SCNC: 105 MMOL/L (ref 98–107)
CLARITY UR: ABNORMAL
CO2 SERPL-SCNC: 21.4 MMOL/L (ref 22–29)
COLOR UR: YELLOW
CREAT BLD-MCNC: 0.7 MG/DL (ref 0.57–1)
DEPRECATED RDW RBC AUTO: 46 FL (ref 37–54)
EOSINOPHIL # BLD AUTO: 0.07 10*3/MM3 (ref 0–0.7)
EOSINOPHIL NFR BLD AUTO: 1.3 % (ref 0.3–6.2)
ERYTHROCYTE [DISTWIDTH] IN BLOOD BY AUTOMATED COUNT: 13.1 % (ref 11.7–13)
GFR SERPL CREATININE-BSD FRML MDRD: 83 ML/MIN/1.73
GLUCOSE BLD-MCNC: 113 MG/DL (ref 65–99)
GLUCOSE UR STRIP-MCNC: NEGATIVE MG/DL
HCT VFR BLD AUTO: 43.8 % (ref 35.6–45.5)
HGB BLD-MCNC: 14.6 G/DL (ref 11.9–15.5)
HGB UR QL STRIP.AUTO: NEGATIVE
HYALINE CASTS UR QL AUTO: ABNORMAL /LPF
IMM GRANULOCYTES # BLD: 0.02 10*3/MM3 (ref 0–0.03)
IMM GRANULOCYTES NFR BLD: 0.4 % (ref 0–0.5)
KETONES UR QL STRIP: NEGATIVE
LEUKOCYTE ESTERASE UR QL STRIP.AUTO: ABNORMAL
LYMPHOCYTES # BLD AUTO: 0.83 10*3/MM3 (ref 0.9–4.8)
LYMPHOCYTES NFR BLD AUTO: 15.6 % (ref 19.6–45.3)
MCH RBC QN AUTO: 32.3 PG (ref 26.9–32)
MCHC RBC AUTO-ENTMCNC: 33.3 G/DL (ref 32.4–36.3)
MCV RBC AUTO: 96.9 FL (ref 80.5–98.2)
MONOCYTES # BLD AUTO: 0.47 10*3/MM3 (ref 0.2–1.2)
MONOCYTES NFR BLD AUTO: 8.9 % (ref 5–12)
NEUTROPHILS # BLD AUTO: 3.91 10*3/MM3 (ref 1.9–8.1)
NEUTROPHILS NFR BLD AUTO: 73.6 % (ref 42.7–76)
NITRITE UR QL STRIP: NEGATIVE
PH UR STRIP.AUTO: 5.5 [PH] (ref 5–8)
PLATELET # BLD AUTO: 243 10*3/MM3 (ref 140–500)
PMV BLD AUTO: 10.2 FL (ref 6–12)
POTASSIUM BLD-SCNC: 3.5 MMOL/L (ref 3.5–5.2)
PROT UR QL STRIP: NEGATIVE
RBC # BLD AUTO: 4.52 10*6/MM3 (ref 3.9–5.2)
RBC # UR: ABNORMAL /HPF
REF LAB TEST METHOD: ABNORMAL
SODIUM BLD-SCNC: 141 MMOL/L (ref 136–145)
SP GR UR STRIP: 1.02 (ref 1–1.03)
SQUAMOUS #/AREA URNS HPF: ABNORMAL /HPF
TRANS CELLS #/AREA URNS HPF: ABNORMAL /HPF
UROBILINOGEN UR QL STRIP: ABNORMAL
WBC NRBC COR # BLD: 5.31 10*3/MM3 (ref 4.5–10.7)
WBC UR QL AUTO: ABNORMAL /HPF

## 2018-09-30 PROCEDURE — 97110 THERAPEUTIC EXERCISES: CPT

## 2018-09-30 PROCEDURE — 87086 URINE CULTURE/COLONY COUNT: CPT | Performed by: NURSE PRACTITIONER

## 2018-09-30 PROCEDURE — 80048 BASIC METABOLIC PNL TOTAL CA: CPT | Performed by: INTERNAL MEDICINE

## 2018-09-30 PROCEDURE — 25010000002 LORAZEPAM PER 2 MG: Performed by: INTERNAL MEDICINE

## 2018-09-30 PROCEDURE — 81001 URINALYSIS AUTO W/SCOPE: CPT | Performed by: NURSE PRACTITIONER

## 2018-09-30 PROCEDURE — 25010000002 CEFTRIAXONE PER 250 MG: Performed by: NURSE PRACTITIONER

## 2018-09-30 PROCEDURE — 99231 SBSQ HOSP IP/OBS SF/LOW 25: CPT | Performed by: NEUROLOGICAL SURGERY

## 2018-09-30 PROCEDURE — 85025 COMPLETE CBC W/AUTO DIFF WBC: CPT | Performed by: INTERNAL MEDICINE

## 2018-09-30 RX ORDER — FOLIC ACID 1 MG/1
1 TABLET ORAL DAILY
Status: DISCONTINUED | OUTPATIENT
Start: 2018-09-30 | End: 2018-10-02 | Stop reason: HOSPADM

## 2018-09-30 RX ORDER — THIAMINE MONONITRATE (VIT B1) 100 MG
100 TABLET ORAL DAILY
Status: DISCONTINUED | OUTPATIENT
Start: 2018-09-30 | End: 2018-10-02 | Stop reason: HOSPADM

## 2018-09-30 RX ADMIN — LORAZEPAM 0.5 MG: 2 INJECTION INTRAMUSCULAR; INTRAVENOUS at 22:32

## 2018-09-30 RX ADMIN — LORAZEPAM 0.5 MG: 2 INJECTION INTRAMUSCULAR; INTRAVENOUS at 09:10

## 2018-09-30 RX ADMIN — HYDROCODONE BITARTRATE AND ACETAMINOPHEN 1 TABLET: 7.5; 325 TABLET ORAL at 21:16

## 2018-09-30 RX ADMIN — DOCUSATE SODIUM 100 MG: 100 CAPSULE, LIQUID FILLED ORAL at 21:16

## 2018-09-30 RX ADMIN — FOLIC ACID 1 MG: 1 TABLET ORAL at 14:42

## 2018-09-30 RX ADMIN — LOSARTAN POTASSIUM 100 MG: 100 TABLET, FILM COATED ORAL at 09:09

## 2018-09-30 RX ADMIN — Medication 100 MG: at 14:42

## 2018-09-30 RX ADMIN — HYDROCODONE BITARTRATE AND ACETAMINOPHEN 1 TABLET: 7.5; 325 TABLET ORAL at 15:59

## 2018-09-30 RX ADMIN — CALCIUM 500 MG: 500 TABLET ORAL at 09:09

## 2018-09-30 RX ADMIN — CEFTRIAXONE SODIUM 1 G: 1 INJECTION, SOLUTION INTRAVENOUS at 05:02

## 2018-09-30 RX ADMIN — HYDROCODONE BITARTRATE AND ACETAMINOPHEN 1 TABLET: 7.5; 325 TABLET ORAL at 09:09

## 2018-09-30 RX ADMIN — AMLODIPINE BESYLATE 5 MG: 5 TABLET ORAL at 09:09

## 2018-09-30 RX ADMIN — METHOCARBAMOL 500 MG: 500 TABLET ORAL at 21:16

## 2018-09-30 RX ADMIN — DOCUSATE SODIUM 100 MG: 100 CAPSULE, LIQUID FILLED ORAL at 09:09

## 2018-10-01 ENCOUNTER — APPOINTMENT (OUTPATIENT)
Dept: GENERAL RADIOLOGY | Facility: HOSPITAL | Age: 68
End: 2018-10-01

## 2018-10-01 ENCOUNTER — ANESTHESIA (OUTPATIENT)
Dept: PERIOP | Facility: HOSPITAL | Age: 68
End: 2018-10-01

## 2018-10-01 ENCOUNTER — ANESTHESIA EVENT (OUTPATIENT)
Dept: PERIOP | Facility: HOSPITAL | Age: 68
End: 2018-10-01

## 2018-10-01 PROBLEM — F32.A DEPRESSION: Status: ACTIVE | Noted: 2018-10-01

## 2018-10-01 PROBLEM — S22.080A CLOSED WEDGE COMPRESSION FRACTURE OF T12 VERTEBRA: Status: ACTIVE | Noted: 2018-09-26

## 2018-10-01 PROBLEM — G47.00 INSOMNIA: Status: ACTIVE | Noted: 2018-10-01

## 2018-10-01 LAB
ANION GAP SERPL CALCULATED.3IONS-SCNC: 10.2 MMOL/L
BACTERIA SPEC AEROBE CULT: NORMAL
BUN BLD-MCNC: 20 MG/DL (ref 8–23)
BUN/CREAT SERPL: 26.3 (ref 7–25)
CALCIUM SPEC-SCNC: 9.8 MG/DL (ref 8.6–10.5)
CHLORIDE SERPL-SCNC: 103 MMOL/L (ref 98–107)
CO2 SERPL-SCNC: 24.8 MMOL/L (ref 22–29)
CREAT BLD-MCNC: 0.76 MG/DL (ref 0.57–1)
GFR SERPL CREATININE-BSD FRML MDRD: 75 ML/MIN/1.73
GLUCOSE BLD-MCNC: 112 MG/DL (ref 65–99)
POTASSIUM BLD-SCNC: 3.6 MMOL/L (ref 3.5–5.2)
SODIUM BLD-SCNC: 138 MMOL/L (ref 136–145)

## 2018-10-01 PROCEDURE — C1713 ANCHOR/SCREW BN/BN,TIS/BN: HCPCS | Performed by: NEUROLOGICAL SURGERY

## 2018-10-01 PROCEDURE — 25010000002 PROPOFOL 10 MG/ML EMULSION: Performed by: NURSE ANESTHETIST, CERTIFIED REGISTERED

## 2018-10-01 PROCEDURE — 80048 BASIC METABOLIC PNL TOTAL CA: CPT | Performed by: INTERNAL MEDICINE

## 2018-10-01 PROCEDURE — 25010000003 CEFAZOLIN IN DEXTROSE 2-4 GM/100ML-% SOLUTION: Performed by: NEUROLOGICAL SURGERY

## 2018-10-01 PROCEDURE — 25010000002 VANCOMYCIN PER 500 MG: Performed by: NEUROLOGICAL SURGERY

## 2018-10-01 PROCEDURE — 22513 PERQ VERTEBRAL AUGMENTATION: CPT | Performed by: NEUROLOGICAL SURGERY

## 2018-10-01 PROCEDURE — 0PU43JZ SUPPLEMENT THORACIC VERTEBRA WITH SYNTHETIC SUBSTITUTE, PERCUTANEOUS APPROACH: ICD-10-PCS | Performed by: NEUROLOGICAL SURGERY

## 2018-10-01 PROCEDURE — 88311 DECALCIFY TISSUE: CPT | Performed by: NEUROLOGICAL SURGERY

## 2018-10-01 PROCEDURE — 99231 SBSQ HOSP IP/OBS SF/LOW 25: CPT | Performed by: NEUROLOGICAL SURGERY

## 2018-10-01 PROCEDURE — 97110 THERAPEUTIC EXERCISES: CPT

## 2018-10-01 PROCEDURE — 0PB43ZX EXCISION OF THORACIC VERTEBRA, PERCUTANEOUS APPROACH, DIAGNOSTIC: ICD-10-PCS | Performed by: NEUROLOGICAL SURGERY

## 2018-10-01 PROCEDURE — 25010000002 CEFTRIAXONE PER 250 MG: Performed by: NURSE PRACTITIONER

## 2018-10-01 PROCEDURE — 25010000002 LORAZEPAM PER 2 MG: Performed by: INTERNAL MEDICINE

## 2018-10-01 PROCEDURE — 25010000002 FENTANYL CITRATE (PF) 100 MCG/2ML SOLUTION: Performed by: NURSE ANESTHETIST, CERTIFIED REGISTERED

## 2018-10-01 PROCEDURE — 25010000002 MIDAZOLAM PER 1 MG: Performed by: ANESTHESIOLOGY

## 2018-10-01 PROCEDURE — 88307 TISSUE EXAM BY PATHOLOGIST: CPT | Performed by: NEUROLOGICAL SURGERY

## 2018-10-01 PROCEDURE — 25010000002 PHENYLEPHRINE PER 1 ML: Performed by: NURSE ANESTHETIST, CERTIFIED REGISTERED

## 2018-10-01 PROCEDURE — 25010000002 ONDANSETRON PER 1 MG: Performed by: NURSE ANESTHETIST, CERTIFIED REGISTERED

## 2018-10-01 PROCEDURE — 0 IOPAMIDOL 41 % SOLUTION: Performed by: NEUROLOGICAL SURGERY

## 2018-10-01 PROCEDURE — 0PS43ZZ REPOSITION THORACIC VERTEBRA, PERCUTANEOUS APPROACH: ICD-10-PCS | Performed by: NEUROLOGICAL SURGERY

## 2018-10-01 DEVICE — BONE CEMENT C01B HV-R WITH MIXER  US
Type: IMPLANTABLE DEVICE | Status: FUNCTIONAL
Brand: KYPHON® HV-R® BONE CEMENT AND KYPHON® MIXER PACK

## 2018-10-01 RX ORDER — EPHEDRINE SULFATE 50 MG/ML
5 INJECTION, SOLUTION INTRAVENOUS ONCE AS NEEDED
Status: DISCONTINUED | OUTPATIENT
Start: 2018-10-01 | End: 2018-10-01 | Stop reason: HOSPADM

## 2018-10-01 RX ORDER — ONDANSETRON 2 MG/ML
4 INJECTION INTRAMUSCULAR; INTRAVENOUS EVERY 6 HOURS PRN
Status: DISCONTINUED | OUTPATIENT
Start: 2018-10-01 | End: 2018-10-02 | Stop reason: HOSPADM

## 2018-10-01 RX ORDER — FENTANYL CITRATE 50 UG/ML
50 INJECTION, SOLUTION INTRAMUSCULAR; INTRAVENOUS
Status: DISCONTINUED | OUTPATIENT
Start: 2018-10-01 | End: 2018-10-01 | Stop reason: HOSPADM

## 2018-10-01 RX ORDER — LIDOCAINE HYDROCHLORIDE 20 MG/ML
INJECTION, SOLUTION INFILTRATION; PERINEURAL AS NEEDED
Status: DISCONTINUED | OUTPATIENT
Start: 2018-10-01 | End: 2018-10-01 | Stop reason: SURG

## 2018-10-01 RX ORDER — HYDROMORPHONE HYDROCHLORIDE 1 MG/ML
0.5 INJECTION, SOLUTION INTRAMUSCULAR; INTRAVENOUS; SUBCUTANEOUS
Status: DISCONTINUED | OUTPATIENT
Start: 2018-10-01 | End: 2018-10-01 | Stop reason: HOSPADM

## 2018-10-01 RX ORDER — ONDANSETRON 2 MG/ML
4 INJECTION INTRAMUSCULAR; INTRAVENOUS ONCE AS NEEDED
Status: DISCONTINUED | OUTPATIENT
Start: 2018-10-01 | End: 2018-10-01 | Stop reason: HOSPADM

## 2018-10-01 RX ORDER — ONDANSETRON 4 MG/1
4 TABLET, FILM COATED ORAL EVERY 6 HOURS PRN
Status: DISCONTINUED | OUTPATIENT
Start: 2018-10-01 | End: 2018-10-02 | Stop reason: HOSPADM

## 2018-10-01 RX ORDER — MIRTAZAPINE 15 MG/1
7.5 TABLET, FILM COATED ORAL NIGHTLY
Status: DISCONTINUED | OUTPATIENT
Start: 2018-10-01 | End: 2018-10-02 | Stop reason: HOSPADM

## 2018-10-01 RX ORDER — CEFAZOLIN SODIUM 2 G/100ML
2 INJECTION, SOLUTION INTRAVENOUS ONCE
Status: DISCONTINUED | OUTPATIENT
Start: 2018-10-01 | End: 2018-10-01 | Stop reason: HOSPADM

## 2018-10-01 RX ORDER — LABETALOL HYDROCHLORIDE 5 MG/ML
5 INJECTION, SOLUTION INTRAVENOUS
Status: DISCONTINUED | OUTPATIENT
Start: 2018-10-01 | End: 2018-10-01 | Stop reason: HOSPADM

## 2018-10-01 RX ORDER — ROCURONIUM BROMIDE 10 MG/ML
INJECTION, SOLUTION INTRAVENOUS AS NEEDED
Status: DISCONTINUED | OUTPATIENT
Start: 2018-10-01 | End: 2018-10-01 | Stop reason: SURG

## 2018-10-01 RX ORDER — FENTANYL CITRATE 50 UG/ML
INJECTION, SOLUTION INTRAMUSCULAR; INTRAVENOUS AS NEEDED
Status: DISCONTINUED | OUTPATIENT
Start: 2018-10-01 | End: 2018-10-01 | Stop reason: SURG

## 2018-10-01 RX ORDER — PROMETHAZINE HYDROCHLORIDE 25 MG/1
25 SUPPOSITORY RECTAL ONCE AS NEEDED
Status: DISCONTINUED | OUTPATIENT
Start: 2018-10-01 | End: 2018-10-01 | Stop reason: HOSPADM

## 2018-10-01 RX ORDER — BISACODYL 5 MG/1
5 TABLET, DELAYED RELEASE ORAL DAILY PRN
Status: DISCONTINUED | OUTPATIENT
Start: 2018-10-01 | End: 2018-10-02 | Stop reason: HOSPADM

## 2018-10-01 RX ORDER — SODIUM CHLORIDE 0.9 % (FLUSH) 0.9 %
1-10 SYRINGE (ML) INJECTION AS NEEDED
Status: DISCONTINUED | OUTPATIENT
Start: 2018-10-01 | End: 2018-10-01 | Stop reason: HOSPADM

## 2018-10-01 RX ORDER — FAMOTIDINE 10 MG/ML
20 INJECTION, SOLUTION INTRAVENOUS ONCE
Status: COMPLETED | OUTPATIENT
Start: 2018-10-01 | End: 2018-10-01

## 2018-10-01 RX ORDER — BISACODYL 10 MG
10 SUPPOSITORY, RECTAL RECTAL DAILY PRN
Status: DISCONTINUED | OUTPATIENT
Start: 2018-10-01 | End: 2018-10-02 | Stop reason: HOSPADM

## 2018-10-01 RX ORDER — ONDANSETRON 4 MG/1
4 TABLET, ORALLY DISINTEGRATING ORAL EVERY 6 HOURS PRN
Status: DISCONTINUED | OUTPATIENT
Start: 2018-10-01 | End: 2018-10-02 | Stop reason: HOSPADM

## 2018-10-01 RX ORDER — PROMETHAZINE HYDROCHLORIDE 25 MG/ML
12.5 INJECTION, SOLUTION INTRAMUSCULAR; INTRAVENOUS ONCE AS NEEDED
Status: DISCONTINUED | OUTPATIENT
Start: 2018-10-01 | End: 2018-10-01 | Stop reason: HOSPADM

## 2018-10-01 RX ORDER — PROPOFOL 10 MG/ML
VIAL (ML) INTRAVENOUS AS NEEDED
Status: DISCONTINUED | OUTPATIENT
Start: 2018-10-01 | End: 2018-10-01 | Stop reason: SURG

## 2018-10-01 RX ORDER — MIDAZOLAM HYDROCHLORIDE 1 MG/ML
2 INJECTION INTRAMUSCULAR; INTRAVENOUS
Status: DISCONTINUED | OUTPATIENT
Start: 2018-10-01 | End: 2018-10-01 | Stop reason: HOSPADM

## 2018-10-01 RX ORDER — SENNA AND DOCUSATE SODIUM 50; 8.6 MG/1; MG/1
1 TABLET, FILM COATED ORAL NIGHTLY PRN
Status: DISCONTINUED | OUTPATIENT
Start: 2018-10-01 | End: 2018-10-02 | Stop reason: HOSPADM

## 2018-10-01 RX ORDER — VILAZODONE HYDROCHLORIDE 10 MG/1
10 TABLET ORAL DAILY
Status: DISCONTINUED | OUTPATIENT
Start: 2018-10-01 | End: 2018-10-02 | Stop reason: HOSPADM

## 2018-10-01 RX ORDER — DOCUSATE SODIUM 100 MG/1
100 CAPSULE, LIQUID FILLED ORAL 2 TIMES DAILY PRN
Status: DISCONTINUED | OUTPATIENT
Start: 2018-10-01 | End: 2018-10-02 | Stop reason: HOSPADM

## 2018-10-01 RX ORDER — MIDAZOLAM HYDROCHLORIDE 1 MG/ML
1 INJECTION INTRAMUSCULAR; INTRAVENOUS
Status: DISCONTINUED | OUTPATIENT
Start: 2018-10-01 | End: 2018-10-01 | Stop reason: HOSPADM

## 2018-10-01 RX ORDER — SODIUM CHLORIDE, SODIUM LACTATE, POTASSIUM CHLORIDE, CALCIUM CHLORIDE 600; 310; 30; 20 MG/100ML; MG/100ML; MG/100ML; MG/100ML
9 INJECTION, SOLUTION INTRAVENOUS CONTINUOUS
Status: DISCONTINUED | OUTPATIENT
Start: 2018-10-01 | End: 2018-10-01

## 2018-10-01 RX ORDER — EPHEDRINE SULFATE 50 MG/ML
INJECTION, SOLUTION INTRAVENOUS AS NEEDED
Status: DISCONTINUED | OUTPATIENT
Start: 2018-10-01 | End: 2018-10-01 | Stop reason: SURG

## 2018-10-01 RX ORDER — VANCOMYCIN HYDROCHLORIDE 500 MG/10ML
INJECTION, POWDER, LYOPHILIZED, FOR SOLUTION INTRAVENOUS AS NEEDED
Status: DISCONTINUED | OUTPATIENT
Start: 2018-10-01 | End: 2018-10-01 | Stop reason: HOSPADM

## 2018-10-01 RX ORDER — LIDOCAINE HYDROCHLORIDE 10 MG/ML
0.5 INJECTION, SOLUTION EPIDURAL; INFILTRATION; INTRACAUDAL; PERINEURAL ONCE AS NEEDED
Status: DISCONTINUED | OUTPATIENT
Start: 2018-10-01 | End: 2018-10-01 | Stop reason: HOSPADM

## 2018-10-01 RX ORDER — CEFAZOLIN SODIUM 2 G/100ML
2 INJECTION, SOLUTION INTRAVENOUS EVERY 8 HOURS
Status: COMPLETED | OUTPATIENT
Start: 2018-10-01 | End: 2018-10-02

## 2018-10-01 RX ORDER — FLUMAZENIL 0.1 MG/ML
0.2 INJECTION INTRAVENOUS AS NEEDED
Status: DISCONTINUED | OUTPATIENT
Start: 2018-10-01 | End: 2018-10-01 | Stop reason: HOSPADM

## 2018-10-01 RX ORDER — PROMETHAZINE HYDROCHLORIDE 25 MG/1
25 TABLET ORAL ONCE AS NEEDED
Status: DISCONTINUED | OUTPATIENT
Start: 2018-10-01 | End: 2018-10-01 | Stop reason: HOSPADM

## 2018-10-01 RX ORDER — ONDANSETRON 2 MG/ML
INJECTION INTRAMUSCULAR; INTRAVENOUS AS NEEDED
Status: DISCONTINUED | OUTPATIENT
Start: 2018-10-01 | End: 2018-10-01 | Stop reason: SURG

## 2018-10-01 RX ORDER — METHOCARBAMOL 500 MG/1
1000 TABLET, FILM COATED ORAL 4 TIMES DAILY PRN
Status: DISCONTINUED | OUTPATIENT
Start: 2018-10-01 | End: 2018-10-02 | Stop reason: HOSPADM

## 2018-10-01 RX ADMIN — HYDROCODONE BITARTRATE AND ACETAMINOPHEN 1 TABLET: 7.5; 325 TABLET ORAL at 18:03

## 2018-10-01 RX ADMIN — PROPOFOL 150 MG: 10 INJECTION, EMULSION INTRAVENOUS at 15:30

## 2018-10-01 RX ADMIN — ROCURONIUM BROMIDE 40 MG: 10 INJECTION INTRAVENOUS at 15:30

## 2018-10-01 RX ADMIN — PHENYLEPHRINE HYDROCHLORIDE 100 MCG: 10 INJECTION INTRAVENOUS at 16:18

## 2018-10-01 RX ADMIN — AMLODIPINE BESYLATE 5 MG: 5 TABLET ORAL at 08:54

## 2018-10-01 RX ADMIN — PHENYLEPHRINE HYDROCHLORIDE 100 MCG: 10 INJECTION INTRAVENOUS at 16:02

## 2018-10-01 RX ADMIN — SUGAMMADEX 150 MG: 100 INJECTION, SOLUTION INTRAVENOUS at 16:29

## 2018-10-01 RX ADMIN — FENTANYL CITRATE 50 MCG: 50 INJECTION INTRAMUSCULAR; INTRAVENOUS at 15:30

## 2018-10-01 RX ADMIN — MIRTAZAPINE 7.5 MG: 15 TABLET, FILM COATED ORAL at 22:04

## 2018-10-01 RX ADMIN — ONDANSETRON 4 MG: 2 INJECTION INTRAMUSCULAR; INTRAVENOUS at 16:05

## 2018-10-01 RX ADMIN — LIDOCAINE HYDROCHLORIDE 80 MG: 20 INJECTION, SOLUTION INFILTRATION; PERINEURAL at 15:30

## 2018-10-01 RX ADMIN — HYDROCODONE BITARTRATE AND ACETAMINOPHEN 1 TABLET: 7.5; 325 TABLET ORAL at 03:06

## 2018-10-01 RX ADMIN — METHOCARBAMOL 500 MG: 500 TABLET ORAL at 22:05

## 2018-10-01 RX ADMIN — MIDAZOLAM HYDROCHLORIDE 1 MG: 2 INJECTION, SOLUTION INTRAMUSCULAR; INTRAVENOUS at 13:07

## 2018-10-01 RX ADMIN — CEFTRIAXONE SODIUM 1 G: 1 INJECTION, SOLUTION INTRAVENOUS at 05:14

## 2018-10-01 RX ADMIN — LOSARTAN POTASSIUM 100 MG: 100 TABLET, FILM COATED ORAL at 08:54

## 2018-10-01 RX ADMIN — HYDROCODONE BITARTRATE AND ACETAMINOPHEN 1 TABLET: 7.5; 325 TABLET ORAL at 08:53

## 2018-10-01 RX ADMIN — LORAZEPAM 0.5 MG: 2 INJECTION INTRAMUSCULAR; INTRAVENOUS at 22:04

## 2018-10-01 RX ADMIN — FENTANYL CITRATE 50 MCG: 50 INJECTION INTRAMUSCULAR; INTRAVENOUS at 16:38

## 2018-10-01 RX ADMIN — HYDROCODONE BITARTRATE AND ACETAMINOPHEN 1 TABLET: 7.5; 325 TABLET ORAL at 22:05

## 2018-10-01 RX ADMIN — FAMOTIDINE 20 MG: 10 INJECTION, SOLUTION INTRAVENOUS at 13:07

## 2018-10-01 RX ADMIN — PHENYLEPHRINE HYDROCHLORIDE 100 MCG: 10 INJECTION INTRAVENOUS at 16:10

## 2018-10-01 RX ADMIN — EPHEDRINE SULFATE 10 MG: 50 INJECTION INTRAMUSCULAR; INTRAVENOUS; SUBCUTANEOUS at 16:20

## 2018-10-01 RX ADMIN — DOCUSATE SODIUM 100 MG: 100 CAPSULE, LIQUID FILLED ORAL at 22:04

## 2018-10-01 RX ADMIN — SODIUM CHLORIDE, POTASSIUM CHLORIDE, SODIUM LACTATE AND CALCIUM CHLORIDE 9 ML/HR: 600; 310; 30; 20 INJECTION, SOLUTION INTRAVENOUS at 13:07

## 2018-10-01 RX ADMIN — VILAZODONE HYDROCHLORIDE 10 MG: 10 TABLET ORAL at 22:05

## 2018-10-01 RX ADMIN — CEFAZOLIN SODIUM 2 G: 2 INJECTION, SOLUTION INTRAVENOUS at 19:35

## 2018-10-01 NOTE — ANESTHESIA PREPROCEDURE EVALUATION
Anesthesia Evaluation     NPO Solid Status: > 8 hours  NPO Liquid Status: > 8 hours           Airway   Mallampati: II  TM distance: >3 FB  Neck ROM: full  no difficulty expected  Dental - normal exam     Pulmonary - normal exam   Cardiovascular - normal exam    (+) hypertension,       Neuro/Psych  (+) psychiatric history,     GI/Hepatic/Renal/Endo      Musculoskeletal     Abdominal  - normal exam   Substance History   (+) alcohol use (Hx of alcohol dependancy),      OB/GYN          Other   (+) arthritis                     Anesthesia Plan    ASA 3     general     intravenous induction   Anesthetic plan, all risks, benefits, and alternatives have been provided, discussed and informed consent has been obtained with: patient.

## 2018-10-01 NOTE — PROGRESS NOTES
Name: Regina Chavez ADMIT: 2018   : 1950  PCP: Regulo Nugent MD    MRN: [id number] LOS: 5 days   AGE/SEX: 68 y.o. female    ROOM: [facility]   Subjective   Patient complains of back pain  Urinary tract infection  History of fall and T12 fracture  Had kyphoplasty    Brief hospital course since admission:  Hx of alcohol abuse  Fall  T 12 fracture  UTI  Depression  Insomnia    I have reviewed past medical history, family history, social history and allergies.  No changes from admission note.      Review of Systems   Constitutional: Positive for fatigue. Negative for fever.   Respiratory: Positive for apnea. Negative for shortness of breath and wheezing.    Musculoskeletal: Positive for back pain.          Objective   Vital Signs  Temp:  [97.4 °F (36.3 °C)-98.6 °F (37 °C)] 97.4 °F (36.3 °C)  Heart Rate:  [] 71  Resp:  [14-20] 16  BP: (111-149)/() 129/93  SpO2:  [94 %-100 %] 97 %  on  Flow (L/min):  [2-4] 2;   Device (Oxygen Therapy): room air  Body mass index is 25.12 kg/m².    Intake/Output Summary (Last 24 hours) at 10/01/18 1803  Last data filed at 10/01/18 1720   Gross per 24 hour   Intake             1000 ml   Output                0 ml   Net             1000 ml       Physical Exam   Constitutional: She is oriented to person, place, and time. She appears well-developed and well-nourished.   HENT:   Head: Atraumatic.   Eyes: Pupils are equal, round, and reactive to light. No scleral icterus.   Cardiovascular: Normal rate and regular rhythm.    Pulmonary/Chest: No accessory muscle usage. No respiratory distress. She has no decreased breath sounds. She has no wheezes.   Abdominal: Soft. Normal appearance and bowel sounds are normal. She exhibits no ascites. There is no hepatosplenomegaly.   Neurological: She is alert and oriented to person, place, and time.   Skin: No laceration and no petechiae noted. No cyanosis. Nails show no clubbing.   Psychiatric: She has a normal mood and  affect. Her behavior is normal.   Vitals reviewed.      Results Review:      Results from last 7 days  Lab Units 09/30/18  0551 09/26/18  1032   WBC 10*3/mm3 5.31 8.47   HEMOGLOBIN g/dL 14.6 12.7   HEMATOCRIT % 43.8 38.8   PLATELETS 10*3/mm3 243 168       Results from last 7 days  Lab Units 10/01/18  0447 09/30/18  0551 09/29/18  0431   SODIUM mmol/L 138 141 140   POTASSIUM mmol/L 3.6 3.5 3.6   CHLORIDE mmol/L 103 105 108*   CO2 mmol/L 24.8 21.4* 22.8   BUN mg/dL 20 23 13   CREATININE mg/dL 0.76 0.70 0.63   GLUCOSE mg/dL 112* 113* 103*   CALCIUM mg/dL 9.8 9.4 9.4       Results from last 7 days  Lab Units 09/27/18  0451 09/26/18  1635   INR  1.03 1.08         Hemoglobin A1C:No results found for: HGBA1C  Glucose Range:No results found for: POCGLU      amLODIPine 5 mg Oral Daily   calcium carbonate (oyster shell) 500 mg Oral Daily   ceFAZolin 2 g Intravenous Q8H   ceftriaxone 1 g Intravenous Q24H   docusate sodium 100 mg Oral BID   folic acid 1 mg Oral Daily   lidocaine-EPINEPHrine 10 mL Injection Once   LORazepam 0.5 mg Intravenous Q8H   losartan 100 mg Oral Daily   mirtazapine 7.5 mg Oral Nightly   thiamine 100 mg Oral Daily   vilazodone 10 mg Oral Daily      Diet Regular  Assessment/Plan       Assessment/Plan      Active Hospital Problems    Diagnosis Date Noted   • **Closed fracture of twelfth thoracic vertebra (CMS/[facility]) [S22.089A] 09/26/2018   • Depression [F32.9] 10/01/2018   • Insomnia [G47.00] 10/01/2018   • UTI (urinary tract infection) [N39.0] 09/27/2018   • Fall [W19.XXXA] 09/26/2018   • Osteopenia of multiple sites [M85.89] 09/26/2018   • Alcohol dependency (CMS/[facility]) [F10.20] 09/26/2018   • Hypokalemia [E87.6] 09/26/2018   • Fracture of thoracic spine (CMS/[facility]) [S22.009A] 09/26/2018   • Closed wedge compression fracture of T12 vertebra (CMS/HCC) [S22.080A] 09/26/2018      Resolved Hospital Problems    Diagnosis Date Noted Date Resolved   No resolved problems to display.     Continue Rocephin IV  Kyphoplasty  on Monday  History of alcohol abuse watch for withdrawal, psychiatrist to see for depression  Repeat urinalysis shows improvement but the cultures are still pending  Patient had kyphoplasty  Possible home if okay with neurosurgery and follow-up with outpatient psychiatry for substance abuse and insomnia        Alea Andrews MD  [facility]  10/01/18

## 2018-10-01 NOTE — THERAPY TREATMENT NOTE
Acute Care - Physical Therapy Treatment Note  [facility]     Patient Name: Regina Chavez  : 1950  MRN: [id number]  Today's Date: 10/1/2018  Onset of Illness/Injury or Date of Surgery: 18  Date of Referral to PT: 18  Referring Physician: Kathy Stanford    Admit Date: 2018    Visit Dx:    ICD-10-CM ICD-9-CM   1. Fall, initial encounter W19.XXXA E888.9   2. Compression fracture of body of thoracic vertebra (CMS/HCC) M48.54XA 733.13   3. Osteopenia of multiple sites M85.89 733.90   4. Forearm laceration, left, initial encounter S51.812A 881.00   5. Closed wedge compression fracture of twelfth thoracic vertebra with routine healing, subsequent encounter S22.080D V54.17   6. Difficulty walking R26.2 719.7     Patient Active Problem List   Diagnosis   • Osteopenia   • Hypertension   • Rupture of implant of left breast   • Fall   • Osteopenia of multiple sites   • Closed fracture of twelfth thoracic vertebra (CMS/HCC)   • Alcohol dependency (CMS/HCC)   • Hypokalemia   • Fracture of thoracic spine (CMS/HCC)   • UTI (urinary tract infection)   • Closed wedge compression fracture of T12 vertebra (CMS/HCC)       Therapy Treatment          Rehabilitation Treatment Summary     Row Name 10/01/18 0831             Treatment Time/Intention    Discipline physical therapy assistant  -      Document Type therapy note (daily note)  -      Subjective Information complains of;pain  -      Mode of Treatment physical therapy  -      Patient/Family Observations pt supine in bed  -      Care Plan Review patient/other agree to care plan  -      Therapy Frequency (PT Clinical Impression) daily  -      Patient Effort good  -      Existing Precautions/Restrictions fall;spinal;brace worn when out of bed  -      Recorded by [] Estefany Alonso PTA 10/01/18 0855      Row Name 10/01/18 0831             Cognitive Assessment/Intervention- PT/OT    Orientation Status (Cognition) oriented x 3  -       Follows Commands (Cognition) WNL  -      Personal Safety Interventions fall prevention program maintained;gait belt;nonskid shoes/slippers when out of bed  -      Recorded by [] Estefany Alonso, MEIR 10/01/18 0855      Row Name 10/01/18 0831             Bed Mobility Assessment/Treatment    Supine-Sit [place] (Bed Mobility) supervision  -      Sit-Supine [place] (Bed Mobility) minimum assist (75% patient effort)  -      Assistive Device (Bed Mobility) bed rails;head of bed elevated  -      Recorded by [] Estefany Alonso, PTA 10/01/18 0855      Row Name 10/01/18 0831             Sit-Stand Transfer    Sit-Stand [place] (Transfers) stand by assist  -      Assistive Device (Sit-Stand Transfers) walker, front-wheeled  -      Recorded by [] Estefany Alonso, [facility] 10/01/18 0855      Row Name 10/01/18 0831             Stand-Sit Transfer    Stand-Sit [place] (Transfers) stand by assist  -      Assistive Device (Stand-Sit Transfers) walker, front-wheeled  -EH      Recorded by [] Estefany Alonso, [facility] 10/01/18 0855      Row Name 10/01/18 0831             Gait/Stairs Assessment/Training    [place] Level (Gait) contact guard  -      Assistive Device (Gait) walker, front-wheeled  -      Distance in Feet (Gait) 140  -EH      Pattern (Gait) step-through  -      Deviations/Abnormal Patterns (Gait) antalgic;michael decreased;stride length decreased  -      Recorded by [] Estefany Alonso PTA 10/01/18 0855      Row Name 10/01/18 0831             Therapeutic Exercise    Lower Extremity (Therapeutic Exercise) LAQ (long arc quad), bilateral;marching while seated  -      Exercise Type (Therapeutic Exercise) AROM (active range of motion)  -      Position (Therapeutic Exercise) seated  -      Sets/Reps (Therapeutic Exercise) X10  -      Recorded by [] Estefany Alonso PTA 10/01/18 0855      Row Name 10/01/18 0831             Positioning and Restraints    Pre-Treatment Position in bed   -EH      Post Treatment Position bed  -EH      In Bed supine;call light within reach;encouraged to call for assist;exit alarm on  -EH      Recorded by [] Estefany Alonso, [facility] 10/01/18 0855      Row Name 10/01/18 0831             Pain Scale: Numbers Pre/Post-Treatment    Pain Location - Orientation lower  -EH      Pain Location back  -EH      Pain Intervention(s) Medication (See MAR);Ambulation/increased activity;Repositioned  -EH      Recorded by [] Estefany Alonso, [facility] 10/01/18 0855      Row Name 10/01/18 0831             Pain Scale: Word Pre/Post-Treatment    Pain: Word Scale, Pretreatment 6 - moderate-severe pain  -EH      Recorded by [] Estefany Alonso, [facility] 10/01/18 0855        User Key  (r) = Recorded By, (t) = Taken By, (c) = Cosigned By    Initials Name Effective Dates Discipline     Estefany Alonso, [facility] 08/19/18 -  PT                     Physical Therapy Education     Title: PT OT SLP Therapies (Done)     Topic: Physical Therapy (Done)     Point: Mobility training (Done)    Learning Progress Summary     Learner Status Readiness Method Response Comment Documented by    Patient Done Acceptance E VU,[facility] 10/01/18 0855     Done Acceptance E,TB JOHN,[facility] 09/30/18 1515     Done Acceptance E,TB,D VU,NR   09/29/18 1451     Done Acceptance E,D VU,NR  MS 09/28/18 1010    Family Done Acceptance E,TB,D VU,NR   09/29/18 1451          Point: Home exercise program (Done)    Learning Progress Summary     Learner Status Readiness Method Response Comment Documented by    Patient Done Acceptance E VU,[facility] 10/01/18 0855     Done Acceptance E,TB VU,CURT   09/30/18 1515     Done Acceptance E,D VU,NR  MS 09/28/18 1010          Point: Body mechanics (Done)    Learning Progress Summary     Learner Status Readiness Method Response Comment Documented by    Patient Done Acceptance E VU,DU   10/01/18 0855     Done Acceptance E,TB VU,DU   09/30/18 1515     Done Acceptance E,TB,D VU,NR   09/29/18 1451     Done Acceptance E,D  VU,NR  MS 09/28/18 1010    Family Done Acceptance E,TB,D JOHN,NR   09/29/18 1451          Point: Precautions (Done)    Learning Progress Summary     Learner Status Readiness Method Response Comment Documented by    Patient Done Acceptance CURT WALLER   10/01/18 0855     Done Acceptance NAJMA MCMAHAN DU   09/30/18 1515     Done Acceptance KIM MCMAHAN,NR  MS 09/28/18 1010                      User Key     Initials Effective Dates Name Provider Type Discipline    MS 04/03/18 -  Tomy Carrillo, PT Physical Therapist PT     04/03/18 -  Ayse Morris, PT Physical Therapist PT     03/07/18 -  Sharath Kuhn, PTA Physical Therapy Assistant PT     08/19/18 -  Estefany Alonso PTA Physical Therapy Assistant PT                    PT Recommendation and Plan  Therapy Frequency (PT Clinical Impression): daily  Plan of Care Reviewed With: patient  Progress: improving  Outcome Summary: Pt tolerated treatment today with moderate c/o pain. Pt able to ambulate 140ft with CGA, rwx, along with perfroming therex while seated.           Outcome Measures     Row Name 10/01/18 0800 09/30/18 1500 09/29/18 1400       How much help from another person do you currently need...    Turning from your back to your side while in flat bed without using bedrails? 3  - 3  -CW 3  -EJ    Moving from lying on back to sitting on the side of a flat bed without bedrails? 3  - 3  -CW 3  -EJ    Moving to and from a bed to a chair (including a wheelchair)? 3  - 3  -CW 3  -EJ    Standing up from a chair using your arms (e.g., wheelchair, bedside chair)? 3  - 3  -CW 3  -EJ    Climbing 3-5 steps with a railing? 2  -EH 2  -CW 2  -EJ    To walk in hospital room? 3  - 3  -CW 3  -EJ    AM-PAC 6 Clicks Score 17  -EH 17  -CW 17  -EJ       Functional Assessment    Outcome Measure Options  -- AM-PAC 6 Clicks Basic Mobility (PT)  -CW AM-PAC 6 Clicks Basic Mobility (PT)  -EJ    Row Name 09/28/18 1000             How much help from another person do you  currently need...    Turning from your back to your side while in flat bed without using bedrails? 3  -MS      Moving from lying on back to sitting on the side of a flat bed without bedrails? 3  -MS      Moving to and from a bed to a chair (including a wheelchair)? 2  -MS      Standing up from a chair using your arms (e.g., wheelchair, bedside chair)? 2  -MS      Climbing 3-5 steps with a railing? 2  -MS      To walk in hospital room? 3  -MS      AM-PAC 6 Clicks Score 15  -MS         Functional Assessment    Outcome Measure Options AM-PAC 6 Clicks Basic Mobility (PT)  -MS        User Key  (r) = Recorded By, (t) = Taken By, (c) = Cosigned By    Initials Name Provider Type    MS CarrilloTomy, PT Physical Therapist    Ayse Jovel, PT Physical Therapist    CW Sharath Kuhn, PTA Physical Therapy Assistant     Estefany Alonso PTA Physical Therapy Assistant           Time Calculation:         PT Charges     Row Name 10/01/18 0857             Time Calculation    Start Time 0831  -      Stop Time 0850  -      Time Calculation (min) 19 min  -      PT Received On 10/01/18  -      PT - Next Appointment 10/02/18  -         Time Calculation- PT    Total Timed Code Minutes- PT 19 minute(s)  -        User Key  (r) = Recorded By, (t) = Taken By, (c) = Cosigned By    Initials Name Provider Type     Estefany Alonso PTA Physical Therapy Assistant        Therapy Suggested Charges     Code   Minutes Charges    None           Therapy Charges for Today     Code Description Service Date Service Provider Modifiers Qty    [id number] HC PT THER PROC EA 15 MIN 10/1/2018 Estefany Alonso PTA GP 1          PT G-Codes  Outcome Measure Options: AM-PAC 6 Clicks Basic Mobility (PT)  AM-PAC 6 Clicks Score: 17    Estefany Alonso PTA  10/1/2018

## 2018-10-01 NOTE — PROGRESS NOTES
[facility] did follow up with pt. Pt states she is due to have surgery at 1PM today. Pt states she saw the psychiatrist yesterday and is willing to try the meds he prescribed once her surgery is over. Pt states she is anxious about the surgery. Access will continue to follow.

## 2018-10-01 NOTE — PROGRESS NOTES
[place] [facility] PROGRESS NOTE    PATIENT IDENTIFICATION:   Name:  Regina Chavez      MRN:  [id number]     68 y.o.  female                   Principal Problem:    Closed fracture of twelfth thoracic vertebra (CMS/HCC)  Active Problems:    Fall    Osteopenia of multiple sites    Alcohol dependency (CMS/HCC)    Hypokalemia    Fracture of thoracic spine (CMS/HCC)    UTI (urinary tract infection)    Closed wedge compression fracture of T12 vertebra (CMS/HCC)        Subjective   CC: back pain  Interval History: discussed case with Dr. Campbell.  Feels procceding to OR is fine.    Objective     Vital signs in last 24 hours:  Temp:  [97.6 °F (36.4 °C)-98.7 °F (37.1 °C)] 97.6 °F (36.4 °C)  Heart Rate:  [] 97  Resp:  [16-18] 16  BP: (116-136)/() 136/92      Intake/Output last 3 shifts:  No intake/output data recorded.  Intake/Output this shift:  No intake/output data recorded.    LABS    Results from last 7 days  Lab Units 09/27/18  0451 09/26/18  1635   INR  1.03 1.08     Lab Results   Component Value Date    CALCIUM 9.8 10/01/2018     Results from last 7 days  Lab Units 10/01/18  0447 09/30/18  0551 09/29/18  0431  09/26/18  1032 09/26/18  0907   SODIUM mmol/L 138 141 140  < >  --  144   POTASSIUM mmol/L 3.6 3.5 3.6  < >  --  3.1*   CHLORIDE mmol/L 103 105 108*  < >  --  109*   CO2 mmol/L 24.8 21.4* 22.8  < >  --  17.2*   BUN mg/dL 20 23 13  < >  --  17   CREATININE mg/dL 0.76 0.70 0.63  < >  --  0.71   GLUCOSE mg/dL 112* 113* 103*  < >  --  98   CALCIUM mg/dL 9.8 9.4 9.4  < >  --  8.4*   WBC 10*3/mm3  --  5.31  --   --  8.47  --    HEMOGLOBIN g/dL  --  14.6  --   --  12.7  --    PLATELETS 10*3/mm3  --  243  --   --  168  --    ALT (SGPT) U/L  --   --   --   --   --  42*   AST (SGOT) U/L  --   --   --   --   --  35*   < > = values in this interval not displayed.  Physical Exam:  5/5 str      4 - Opens eyes on own  6 - Follows simple motor commands  5 - Alert and  oriented        ASSESSMENT  Assessment/Plan     Thoracic comp frx     LOS: 5 days     Plan on kypho today  All r/b/a discussed on several occasions with patient.  She wishes to proceed    Orlin Warren IV, MD

## 2018-10-01 NOTE — PLAN OF CARE
Problem: Patient Care Overview  Goal: Plan of Care Review  Outcome: Ongoing (interventions implemented as appropriate)   10/01/18 08   Coping/Psychosocial   Plan of Care Reviewed With patient   Plan of Care Review   Progress improving   OTHER   Outcome Summary Pt tolerated treatment today with moderate c/o pain. Pt able to ambulate 140ft with CGA, rwx, along with perfroming therex while seated.

## 2018-10-01 NOTE — OP NOTE
KYPHOPLASTY 1-2 LEVELS  Procedure Note    Regina Chavez  9/26/2018 - 10/1/2018  [id number]    SURGEON  Orlin Warren IV, MD    ASSISTANT:  Tita Manuel CSA  INDICATION:  Intractable pain secondary to pathological compression fracture.    Pre-op Diagnosis:   Closed wedge compression fracture of twelfth thoracic vertebra with routine healing, subsequent encounter [S22.080D]    Post-Op Diagnosis Codes:     * Closed wedge compression fracture of twelfth thoracic vertebra with routine healing, subsequent encounter [S22.080D]    PROCEDURE PERFORMED:  Procedure(s):  KYPHOPLASTY WITH POSSIBLE BIOPSY, THORACIC 12      Anesthesia: General    Staff:   Circulator: Regulo Cox RN  Scrub Person: Elaine Rosette    Estimated Blood Loss: minimal    Specimens:   Order Name Source Comment Collection Info Order Time   TISSUE PATHOLOGY EXAM Spine, Thoracic  Collected By: Orlin Warren IV, MD 10/1/2018  4:24 PM         Drains:  None immediate    Findings: Compression fracture    Complications: none immediate    Details: The patient was brought to the operating room where general endotracheal anesthesia was induced.  The patient was placed prone onto bolsters onto the biplane angiography table and care was taken to pad all susceptible areas.    Biplane fluoroscopy was used throughout the operative intervention to localize the location of the compression fracture and to guide the instrumentation.    Positioning, localization and biopsy.  The t12 level was identified with fluoroscopy and local anesthesia was injected over the pedicle.  An incision was made overlying the pedicles bilaterally in the bone dissecting tool and obturator was advanced through the skin and subcutaneous tissues and musculature through the pedicle into the vertebral body.  The bone dissecting tool was removed leaving the obturator in place.  A core biopsy device was advanced through the  into the vertebral body and used in order to  obtain a biopsy which was sent for permanent section evaluation.    Open reduction of fracture: A 15mm kypoplastic balloon was then advanced into the vertebral body and under fluoroscopic guidance inflated to reduce the compression fracture.  Once satisfactory reduction was obtained the balloon was slowly deflated.    Replacement of vertebral body: A mixture of methylmethacrylate, barium sulfate, and vancomycin was then placed into the bone void filler devices and a series of bone void filler devices were then used in order to satisfactorily replaced the vertebral body.  This was accomplished under fluoroscopic guidance.  Once the vertebral body was satisfactorily replaced and the cement was allowed to harden, the bone void filler device and obturator were removed.    Closure: A Monocryl stitch and Dermabond was used to close the wound.  The patient was then awakened from general endotracheal anesthesia, extubated, and taken to the recovery room in stable and good condition.  Orlin Warren IV, MD     Date: 10/1/2018  Time: 4:40 PM

## 2018-10-01 NOTE — ANESTHESIA POSTPROCEDURE EVALUATION
Patient: Regina Chavez    Procedure Summary     Date:  10/01/18 Room / Location:   [facility] OR 19 INV /  [facility]BRID OR 18/19    Anesthesia Start:  1521 Anesthesia Stop:  1645    Procedure:  KYPHOPLASTY WITH POSSIBLE BIOPSY, THORACIC 12 (Bilateral Spine Lumbar) Diagnosis:       Closed wedge compression fracture of twelfth thoracic vertebra with routine healing, subsequent encounter      (Closed wedge compression fracture of twelfth thoracic vertebra with routine healing, subsequent encounter [S22.011D])    Surgeon:  Orlin Warren IV, MD Provider:  Jacoby Grier MD    Anesthesia Type:  general ASA Status:  3          Anesthesia Type: general  Last vitals  BP   125/77 (10/01/18 1718)   Temp   36.3 °C (97.4 °F) (10/01/18 1718)   Pulse   86 (10/01/18 1718)   Resp   18 (10/01/18 1718)     SpO2   98 % (10/01/18 1718)     Post Anesthesia Care and Evaluation    Patient location during evaluation: PACU  Level of consciousness: awake and alert  Anesthetic complications: No anesthetic complications

## 2018-10-01 NOTE — ANESTHESIA PROCEDURE NOTES
Airway  Urgency: elective    Airway not difficult    General Information and Staff    Patient location during procedure: OR  CRNA: OANH BEVERLY    Indications and Patient Condition  Indications for airway management: airway protection    Preoxygenated: yes  Mask difficulty assessment: 1 - vent by mask    Final Airway Details  Final airway type: endotracheal airway      Successful airway: ETT  Cuffed: yes   Successful intubation technique: direct laryngoscopy  Endotracheal tube insertion site: oral  Blade: Padmini  Blade size: 3  ETT size: 7.0 mm  Cormack-Lehane Classification: grade I - full view of glottis  Placement verified by: chest auscultation and capnometry   Measured from: lips  ETT to lips (cm): 21  Number of attempts at approach: 1    Additional Comments   ett cuff up at MOP

## 2018-10-02 ENCOUNTER — TELEPHONE (OUTPATIENT)
Dept: NEUROSURGERY | Facility: CLINIC | Age: 68
End: 2018-10-02

## 2018-10-02 VITALS
HEART RATE: 90 BPM | RESPIRATION RATE: 17 BRPM | SYSTOLIC BLOOD PRESSURE: 129 MMHG | OXYGEN SATURATION: 95 % | BODY MASS INDEX: 25.01 KG/M2 | TEMPERATURE: 98.2 F | DIASTOLIC BLOOD PRESSURE: 95 MMHG | HEIGHT: 66 IN | WEIGHT: 155.65 LBS

## 2018-10-02 DIAGNOSIS — M54.5 LOW BACK PAIN, UNSPECIFIED BACK PAIN LATERALITY, UNSPECIFIED CHRONICITY, WITH SCIATICA PRESENCE UNSPECIFIED: Primary | ICD-10-CM

## 2018-10-02 PROBLEM — E87.6 HYPOKALEMIA: Status: RESOLVED | Noted: 2018-09-26 | Resolved: 2018-10-02

## 2018-10-02 PROBLEM — N39.0 UTI (URINARY TRACT INFECTION): Status: RESOLVED | Noted: 2018-09-27 | Resolved: 2018-10-02

## 2018-10-02 LAB
ANION GAP SERPL CALCULATED.3IONS-SCNC: 9.2 MMOL/L
BUN BLD-MCNC: 17 MG/DL (ref 8–23)
BUN/CREAT SERPL: 20 (ref 7–25)
CALCIUM SPEC-SCNC: 9.4 MG/DL (ref 8.6–10.5)
CHLORIDE SERPL-SCNC: 106 MMOL/L (ref 98–107)
CO2 SERPL-SCNC: 25.8 MMOL/L (ref 22–29)
CREAT BLD-MCNC: 0.85 MG/DL (ref 0.57–1)
GFR SERPL CREATININE-BSD FRML MDRD: 66 ML/MIN/1.73
GLUCOSE BLD-MCNC: 101 MG/DL (ref 65–99)
POTASSIUM BLD-SCNC: 3.7 MMOL/L (ref 3.5–5.2)
SODIUM BLD-SCNC: 141 MMOL/L (ref 136–145)

## 2018-10-02 PROCEDURE — 97116 GAIT TRAINING THERAPY: CPT

## 2018-10-02 PROCEDURE — 25010000002 CEFTRIAXONE PER 250 MG: Performed by: NURSE PRACTITIONER

## 2018-10-02 PROCEDURE — 25010000002 LORAZEPAM PER 2 MG: Performed by: INTERNAL MEDICINE

## 2018-10-02 PROCEDURE — 25010000003 CEFAZOLIN IN DEXTROSE 2-4 GM/100ML-% SOLUTION: Performed by: NEUROLOGICAL SURGERY

## 2018-10-02 PROCEDURE — 80048 BASIC METABOLIC PNL TOTAL CA: CPT | Performed by: INTERNAL MEDICINE

## 2018-10-02 PROCEDURE — 99024 POSTOP FOLLOW-UP VISIT: CPT | Performed by: NURSE PRACTITIONER

## 2018-10-02 RX ORDER — HYDROCODONE BITARTRATE AND ACETAMINOPHEN 7.5; 325 MG/1; MG/1
1 TABLET ORAL EVERY 8 HOURS PRN
Qty: 10 TABLET | Refills: 0 | Status: SHIPPED | OUTPATIENT
Start: 2018-10-02 | End: 2018-10-07

## 2018-10-02 RX ORDER — METHOCARBAMOL 500 MG/1
500 TABLET, FILM COATED ORAL EVERY 6 HOURS PRN
Qty: 20 TABLET | Refills: 0 | Status: SHIPPED | OUTPATIENT
Start: 2018-10-02 | End: 2020-04-08 | Stop reason: ALTCHOICE

## 2018-10-02 RX ORDER — MIRTAZAPINE 7.5 MG/1
7.5 TABLET, FILM COATED ORAL NIGHTLY
Qty: 30 TABLET | Refills: 0 | Status: SHIPPED | OUTPATIENT
Start: 2018-10-02 | End: 2020-04-08 | Stop reason: ALTCHOICE

## 2018-10-02 RX ORDER — FOLIC ACID 1 MG/1
1 TABLET ORAL DAILY
COMMUNITY
Start: 2018-10-03 | End: 2020-04-08 | Stop reason: ALTCHOICE

## 2018-10-02 RX ORDER — LANOLIN ALCOHOL/MO/W.PET/CERES
100 CREAM (GRAM) TOPICAL DAILY
COMMUNITY
Start: 2018-10-03 | End: 2020-04-08 | Stop reason: ALTCHOICE

## 2018-10-02 RX ORDER — VILAZODONE HYDROCHLORIDE 10 MG/1
10 TABLET ORAL DAILY
Qty: 30 TABLET | Refills: 0 | Status: SHIPPED | OUTPATIENT
Start: 2018-10-03 | End: 2020-04-08 | Stop reason: ALTCHOICE

## 2018-10-02 RX ADMIN — METHOCARBAMOL 1000 MG: 500 TABLET ORAL at 15:32

## 2018-10-02 RX ADMIN — AMLODIPINE BESYLATE 5 MG: 5 TABLET ORAL at 08:03

## 2018-10-02 RX ADMIN — DOCUSATE SODIUM 100 MG: 100 CAPSULE, LIQUID FILLED ORAL at 08:02

## 2018-10-02 RX ADMIN — FOLIC ACID 1 MG: 1 TABLET ORAL at 08:03

## 2018-10-02 RX ADMIN — HYDROCODONE BITARTRATE AND ACETAMINOPHEN 1 TABLET: 7.5; 325 TABLET ORAL at 15:32

## 2018-10-02 RX ADMIN — HYDROCODONE BITARTRATE AND ACETAMINOPHEN 1 TABLET: 7.5; 325 TABLET ORAL at 02:25

## 2018-10-02 RX ADMIN — CEFTRIAXONE SODIUM 1 G: 1 INJECTION, SOLUTION INTRAVENOUS at 06:23

## 2018-10-02 RX ADMIN — CEFAZOLIN SODIUM 2 G: 2 INJECTION, SOLUTION INTRAVENOUS at 02:27

## 2018-10-02 RX ADMIN — HYDROCODONE BITARTRATE AND ACETAMINOPHEN 1 TABLET: 7.5; 325 TABLET ORAL at 10:31

## 2018-10-02 RX ADMIN — Medication 100 MG: at 08:02

## 2018-10-02 RX ADMIN — CALCIUM 500 MG: 500 TABLET ORAL at 08:02

## 2018-10-02 RX ADMIN — VILAZODONE HYDROCHLORIDE 10 MG: 10 TABLET ORAL at 08:02

## 2018-10-02 RX ADMIN — LORAZEPAM 0.5 MG: 2 INJECTION INTRAMUSCULAR; INTRAVENOUS at 06:23

## 2018-10-02 RX ADMIN — LOSARTAN POTASSIUM 100 MG: 100 TABLET, FILM COATED ORAL at 08:02

## 2018-10-02 RX ADMIN — METHOCARBAMOL 1000 MG: 500 TABLET ORAL at 08:02

## 2018-10-02 RX ADMIN — HYDROCODONE BITARTRATE AND ACETAMINOPHEN 1 TABLET: 7.5; 325 TABLET ORAL at 06:23

## 2018-10-02 NOTE — THERAPY TREATMENT NOTE
Acute Care - Physical Therapy Treatment Note  [facility]     Patient Name: Regina Chavez  : 1950  MRN: [id number]  Today's Date: 10/2/2018  Onset of Illness/Injury or Date of Surgery: 18  Date of Referral to PT: 18  Referring Physician: Kathy Stanford    Admit Date: 2018    Visit Dx:    ICD-10-CM ICD-9-CM   1. Fall, initial encounter W19.XXXA E888.9   2. Compression fracture of body of thoracic vertebra (CMS/HCC) M48.54XA 733.13   3. Osteopenia of multiple sites M85.89 733.90   4. Forearm laceration, left, initial encounter S51.812A 881.00   5. Closed wedge compression fracture of twelfth thoracic vertebra with routine healing, subsequent encounter S22.080D V54.17   6. Difficulty walking R26.2 719.7     Patient Active Problem List   Diagnosis   • Osteopenia   • Hypertension   • Rupture of implant of left breast   • Fall   • Osteopenia of multiple sites   • Closed fracture of twelfth thoracic vertebra (CMS/HCC)   • Alcohol dependency (CMS/HCC)   • Hypokalemia   • Fracture of thoracic spine (CMS/HCC)   • UTI (urinary tract infection)   • Closed wedge compression fracture of T12 vertebra (CMS/HCC)   • Depression   • Insomnia       Therapy Treatment          Rehabilitation Treatment Summary     Row Name 10/02/18 1018             Treatment Time/Intention    Discipline physical therapy assistant  -      Document Type therapy note (daily note)  -      Subjective Information complains of;pain  -      Mode of Treatment physical therapy  -      Patient/Family Observations Pt reclined in chair  -      Care Plan Review patient/other agree to care plan  -      Therapy Frequency (PT Clinical Impression) daily  -EH      Patient Effort good  -      Existing Precautions/Restrictions fall;spinal;brace worn when out of bed  -EH      Recorded by [] Estefany Alonso PTA 10/02/18 1035      Row Name 10/02/18 1018             Cognitive Assessment/Intervention- PT/OT    Orientation Status  (Cognition) oriented x 3  -EH      Follows Commands (Cognition) WNL  -EH      Personal Safety Interventions fall prevention program maintained;gait belt;nonskid shoes/slippers when out of bed  -EH      Recorded by [] Estefany Alonso, MEIR 10/02/18 1035      Row Name 10/02/18 1018             Sit-Stand Transfer    Sit-Stand Edgar (Transfers) contact guard  -EH      Assistive Device (Sit-Stand Transfers) walker, front-wheeled  -EH      Recorded by [] Estefany Alonso, MEIR 10/02/18 1035      Row Name 10/02/18 1018             Stand-Sit Transfer    Stand-Sit Edgar (Transfers) contact guard  -EH      Assistive Device (Stand-Sit Transfers) walker, front-wheeled  -EH      Recorded by [] Estefany Alonso PTA 10/02/18 1035      Row Name 10/02/18 1018             Gait/Stairs Assessment/Training    Edgar Level (Gait) contact guard  -EH      Assistive Device (Gait) walker, front-wheeled  -EH      Distance in Feet (Gait) 300  -EH      Pattern (Gait) step-through  -EH      Deviations/Abnormal Patterns (Gait) antalgic;michael decreased  -EH      Recorded by [] Estefany Alonso, MEIR 10/02/18 1035      Row Name 10/02/18 1018             Positioning and Restraints    Pre-Treatment Position sitting in chair/recliner  -EH      Post Treatment Position chair  -EH      In Chair reclined;call light within reach;encouraged to call for assist;exit alarm on;with nsg  -EH      Recorded by [] Estefany Alonso, PTA 10/02/18 1035      Row Name 10/02/18 1018             Pain Scale: Word Pre/Post-Treatment    Pain: Word Scale, Pretreatment 4 - moderate pain  -EH      Recorded by [] Estefany Alonso, PTA 10/02/18 1035      Row Name                Wound 10/01/18 1650 Bilateral back incision    Wound - Properties Group Date first assessed: 10/01/18 [WC] Time first assessed: 1650 [WC] Side: Bilateral [WC] Location: back [WC] Type: incision [WC] Recorded by:  [WC] Regulo Cox RN 10/01/18 1650      User Key  (r) = Recorded By, (t) =  Taken By, (c) = Cosigned By    Initials Name Effective Dates Discipline     Estefany Alonso, PTA 08/19/18 -  PT    Regulo Canada, RN 11/09/17 -  Nurse          Wound 10/01/18 1650 Bilateral back incision (Active)   Dressing Appearance no drainage;open to air 10/2/2018  8:03 AM   Closure Liquid skin adhesive 10/2/2018  8:03 AM   Drainage Amount none 10/2/2018  8:03 AM   Dressing Care, Wound open to air 10/2/2018  8:03 AM             Physical Therapy Education     Title: PT OT SLP Therapies (Done)     Topic: Physical Therapy (Done)     Point: Mobility training (Done)    Learning Progress Summary     Learner Status Readiness Method Response Comment Documented by    Patient Done Acceptance E VU,[facility] 10/02/18 1035     Done Acceptance E VU,[facility] 10/01/18 0855     Done Acceptance E,TB VU,DU   09/30/18 1515     Done Acceptance E,TB,D VU,NR   09/29/18 1451     Done Acceptance E,D VU,NR  MS 09/28/18 1010    Family Done Acceptance E,TB,D VU,NR   09/29/18 1451          Point: Home exercise program (Done)    Learning Progress Summary     Learner Status Readiness Method Response Comment Documented by    Patient Done Acceptance E VU,[facility] 10/02/18 1035     Done Acceptance E VU,[facility] 10/01/18 0855     Done Acceptance E,TB VU,DU  CW 09/30/18 1515     Done Acceptance E,D VU,NR  MS 09/28/18 1010          Point: Body mechanics (Done)    Learning Progress Summary     Learner Status Readiness Method Response Comment Documented by    Patient Done Acceptance E VU,[facility] 10/02/18 1035     Done Acceptance E VU,[facility] 10/01/18 0855     Done Acceptance E,TB VU,DU   09/30/18 1515     Done Acceptance E,TB,D VU,NR   09/29/18 1451     Done Acceptance E,D VU,NR  MS 09/28/18 1010    Family Done Acceptance E,TB,D VU,NR   09/29/18 1451          Point: Precautions (Done)    Learning Progress Summary     Learner Status Readiness Method Response Comment Documented by    Patient Done Acceptance E VU,[facility] 10/02/18 1035     Done  Acceptance E JOHN,DU   10/01/18 0855     Done Acceptance E,TB VU,DU   09/30/18 1515     Done Acceptance E,D VU,NR  MS 09/28/18 1010                      User Key     Initials Effective Dates Name Provider Type Discipline    MS 04/03/18 -  Tomy Carrillo, PT Physical Therapist PT    EJ 04/03/18 -  Ayse Morris, PT Physical Therapist PT    CW 03/07/18 -  Sharath Kuhn, PTA Physical Therapy Assistant PT     08/19/18 -  Estefany Alonso PTA Physical Therapy Assistant PT                    PT Recommendation and Plan  Therapy Frequency (PT Clinical Impression): daily  Plan of Care Reviewed With: patient  Progress: improving  Outcome Summary: Pt tolerated treatement today with minimal c/o pain. Pt able to ambulate 300ft CGA, with rwx before feeling fatigued.           Outcome Measures     Row Name 10/02/18 1000 10/01/18 0800 09/30/18 1500       How much help from another person do you currently need...    Turning from your back to your side while in flat bed without using bedrails? 3  -EH 3  -EH 3  -CW    Moving from lying on back to sitting on the side of a flat bed without bedrails? 3  -EH 3  -EH 3  -CW    Moving to and from a bed to a chair (including a wheelchair)? 3  -EH 3  -EH 3  -CW    Standing up from a chair using your arms (e.g., wheelchair, bedside chair)? 3  -EH 3  -EH 3  -CW    Climbing 3-5 steps with a railing? 2  -EH 2  -EH 2  -CW    To walk in hospital room? 3  -EH 3  -EH 3  -CW    AM-PAC 6 Clicks Score 17  -EH 17  -EH 17  -CW       Functional Assessment    Outcome Measure Options  --  -- AM-PAC 6 Clicks Basic Mobility (PT)  -CW    Row Name 09/29/18 1400             How much help from another person do you currently need...    Turning from your back to your side while in flat bed without using bedrails? 3  -EJ      Moving from lying on back to sitting on the side of a flat bed without bedrails? 3  -EJ      Moving to and from a bed to a chair (including a wheelchair)? 3  -EJ      Standing up  from a chair using your arms (e.g., wheelchair, bedside chair)? 3  -EJ      Climbing 3-5 steps with a railing? 2  -EJ      To walk in hospital room? 3  -EJ      AM-PAC 6 Clicks Score 17  -EJ         Functional Assessment    Outcome Measure Options AM-PAC 6 Clicks Basic Mobility (PT)  -        User Key  (r) = Recorded By, (t) = Taken By, (c) = Cosigned By    Initials Name Provider Type     Ayse Morris, PT Physical Therapist    CW Sharath Kuhn, PTA Physical Therapy Assistant     Estefany Alonso PTA Physical Therapy Assistant           Time Calculation:         PT Charges     Row Name 10/02/18 1036             Time Calculation    Start Time 1018  -      Stop Time 1031  -      Time Calculation (min) 13 min  -      PT Received On 10/02/18  -      PT - Next Appointment 10/03/18  -         Time Calculation- PT    Total Timed Code Minutes- PT 13 minute(s)  -        User Key  (r) = Recorded By, (t) = Taken By, (c) = Cosigned By    Initials Name Provider Type     Estefany Alonso PTA Physical Therapy Assistant        Therapy Suggested Charges     Code   Minutes Charges    None           Therapy Charges for Today     Code Description Service Date Service Provider Modifiers Qty    67847992985 HC PT THER PROC EA 15 MIN 10/1/2018 Estefany Alonso PTA GP 1    69050771396 HC GAIT TRAINING EA 15 MIN 10/2/2018 Estefany Alonso PTA GP 1          PT G-Codes  Outcome Measure Options: AM-PAC 6 Clicks Basic Mobility (PT)  AM-PAC 6 Clicks Score: 17    Estefany Alonso PTA  10/2/2018

## 2018-10-02 NOTE — TELEPHONE ENCOUNTER
Patients appointment is with VAISHALI Burden on 10/18/18 with lumbar and thoracic xrays prior at [facility]. I called [facility] and spoke to Gia and advised of these appointments for discharge. Letter/Forms sent.

## 2018-10-02 NOTE — DISCHARGE INSTR - ACTIVITY
No bending/twisting or lifting over 5-10 pounds.  Ok to shower. Do not submerge incision in water.  Wear back brace for comfort only.

## 2018-10-02 NOTE — TELEPHONE ENCOUNTER
Patient currently in hospital and Dr. Warren has seen her. She wants to know what her prognosis is and has questions she needs to ask you. She is upset and in a lot of pain.

## 2018-10-02 NOTE — TELEPHONE ENCOUNTER
Patient is still inpatient at [facility].Patient had a kyphoplasty done yesterday by DR Warren.     She would like to know how soon after you have this done can you take a shower or bath.  Please advise  [place]  [phone]

## 2018-10-02 NOTE — PLAN OF CARE
Problem: Patient Care Overview  Goal: Plan of Care Review  Outcome: Ongoing (interventions implemented as appropriate)   10/02/18 1035   Coping/Psychosocial   Plan of Care Reviewed With patient   Plan of Care Review   Progress improving   OTHER   Outcome Summary Pt tolerated treatement today with minimal c/o pain. Pt able to ambulate 300ft CGA, with rwx before feeling fatigued.

## 2018-10-02 NOTE — PROGRESS NOTES
[facility] PROGRESS NOTE    PATIENT IDENTIFICATION:   Name:  Regina Chavez      MRN:  [id number]     68 y.o.  female               CC: POD 1 for T12 Kypho      Subjective     Interval History: I'm no better. C/o low back pain; nursing staff states she is walking unassisted; confused and with unusual per staff- c/o conspiracy theories, urinating in cup and handing to people    Objective     Vital signs in last 24 hours:  Temp:  [97.4 °F (36.3 °C)-98.6 °F (37 °C)] 98.1 °F (36.7 °C)  Heart Rate:  [] 94  Resp:  [14-20] 18  BP: (111-149)/() 136/93    Intake/Output last 3 shifts:  I/O last 3 completed shifts:  In: 1360 [P.O.:360; I.V.:1000]  Out: -     Intake/Output this shift:  No intake/output data recorded.      Physical Exam:  General:   Awake, alert, disoriented; repetitive questions; does not appear in discomfort- leans forward and moves about in chair with no complaints  Back:     2 thoracic stab incisions well approximated-     no R/E/D; no TTP along T/L spinous      Processes; tender paraspinous    LABS:    Lab Results (last 24 hours)     Procedure Component Value Units Date/Time    Tissue Pathology Exam [[id number]] Collected:  10/01/18 1557    Specimen:  Bone from Spine, Thoracic Updated:  10/01/18 2148    Basic Metabolic Panel [[id number]]  (Abnormal) Collected:  10/02/18 0544    Specimen:  Blood Updated:  10/02/18 0641     Glucose 101 (H) mg/dL      BUN 17 mg/dL      Creatinine 0.85 mg/dL      Sodium 141 mmol/L      Potassium 3.7 mmol/L      Chloride 106 mmol/L      CO2 25.8 mmol/L      Calcium 9.4 mg/dL      eGFR Non African Amer 66 mL/min/1.73      BUN/Creatinine Ratio 20.0     Anion Gap 9.2 mmol/L     Narrative:       GFR Normal >60  Chronic Kidney Disease <60  Kidney Failure <15          IMAGING STUDIES:    No new imaging      Meds reviewed/changed: Yes    Current Facility-Administered Medications   Medication Dose Route Frequency Provider Last Rate Last Dose   •  amLODIPine (NORVASC) tablet 5 mg  5 mg Oral Daily Alea Andrews MD   5 mg at 10/02/18 0803   • bisacodyl (DULCOLAX) EC tablet 5 mg  5 mg Oral Daily PRN Orlin Warren IV, MD       • bisacodyl (DULCOLAX) suppository 10 mg  10 mg Rectal Daily PRN Orlin Warren IV, MD       • calcium carbonate (oyster shell) tablet 500 mg  500 mg Oral Daily Alea Andrews MD   500 mg at 10/02/18 0802   • cefTRIAXone (ROCEPHIN) IVPB 1 g  1 g Intravenous Q24H Veronica Bland APRN 100 mL/hr at 10/02/18 0623 1 g at 10/02/18 0623   • docusate sodium (COLACE) capsule 100 mg  100 mg Oral BID Alea Andrews MD   100 mg at 10/02/18 0802   • docusate sodium (COLACE) capsule 100 mg  100 mg Oral BID PRN Orlin Warren IV, MD       • folic acid (FOLVITE) tablet 1 mg  1 mg Oral Daily Alea Andrews MD   1 mg at 10/02/18 0803   • HYDROcodone-acetaminophen (NORCO) 7.5-325 MG per tablet 1 tablet  1 tablet Oral Q4H PRN Alea Andrews MD   1 tablet at 10/02/18 1031   • HYDROmorphone (DILAUDID) injection 0.5 mg  0.5 mg Intravenous Q4H PRN lAea Andrews MD       • HYDROmorphone (DILAUDID) injection 1 mg  1 mg Intramuscular Q4H PRN Alea Andrews MD   1 mg at 09/27/18 1206    And   • naloxone (NARCAN) injection 0.4 mg  0.4 mg Intravenous Q5 Min PRN Alea Andrews MD       • lidocaine-EPINEPHrine (XYLOCAINE W/EPI) 1 %-1:994157 injection 10 mL  10 mL Injection Once Brittny Correa PA       • LORazepam (ATIVAN) injection 0.5 mg  0.5 mg Intravenous Q8H Alea Andrews MD   0.5 mg at 10/02/18 0623   • losartan (COZAAR) tablet 100 mg  100 mg Oral Daily Alea Andrews MD   100 mg at 10/02/18 0802   • magnesium hydroxide (MILK OF MAGNESIA) suspension 2400 mg/10mL 10 mL  10 mL Oral Daily PRN Orlin Warren IV, MD       • methocarbamol (ROBAXIN) tablet 1,000 mg  1,000 mg Oral 4x Daily PRN Olrin Warren IV, MD   1,000 mg at 10/02/18 0802   • methocarbamol (ROBAXIN) tablet 500  mg  500 mg Oral Q6H PRN Yaima Morrissey APRN   500 mg at 10/01/18 2205   • mirtazapine (REMERON) tablet 7.5 mg  7.5 mg Oral Nightly Alea Andrews MD   7.5 mg at 10/01/18 2204   • ondansetron (ZOFRAN) tablet 4 mg  4 mg Oral Q6H PRN Alea Andrews MD        Or   • ondansetron ODT (ZOFRAN-ODT) disintegrating tablet 4 mg  4 mg Oral Q6H PRN Alea Andrews MD        Or   • ondansetron (ZOFRAN) injection 4 mg  4 mg Intravenous Q6H PRN Alea Andrews MD       • ondansetron (ZOFRAN) tablet 4 mg  4 mg Oral Q6H PRN Orlin Warren IV, MD        Or   • ondansetron ODT (ZOFRAN-ODT) disintegrating tablet 4 mg  4 mg Oral Q6H PRN Orlin Warren IV, MD        Or   • ondansetron (ZOFRAN) injection 4 mg  4 mg Intravenous Q6H PRN Orlin Warren IV, MD       • potassium chloride (KLOR-CON) packet 40 mEq  40 mEq Oral PRN Alea Andrews MD       • potassium chloride (MICRO-K) CR capsule 40 mEq  40 mEq Oral PRN Alea Andrews MD       • sennosides-docusate sodium (SENOKOT-S) 8.6-50 MG tablet 1 tablet  1 tablet Oral Nightly PRN Orlin Warren IV, MD       • sodium chloride 0.9 % flush 1-10 mL  1-10 mL Intravenous PRN Alea Andrews MD       • sodium chloride 0.9 % flush 10 mL  10 mL Intravenous PRN Regulo Mojica MD   10 mL at 09/26/18 0948   • thiamine (VITAMIN B-1) tablet 100 mg  100 mg Oral Daily Alea Andrews MD   100 mg at 10/02/18 0802   • vilazodone (VIIBRYD) tablet 10 mg  10 mg Oral Daily Alea Andrews MD   10 mg at 10/02/18 0802       Assessment/Plan     ASSESSMENT:    Principal Problem:    Closed fracture of twelfth thoracic vertebra (CMS/HCC)  Active Problems:    Fall    Osteopenia of multiple sites    Alcohol dependency (CMS/HCC)    Hypokalemia    Fracture of thoracic spine (CMS/HCC)    UTI (urinary tract infection)    Closed wedge compression fracture of T12 vertebra (CMS/HCC)    Depression    Insomnia      PLAN: s/p T12 kyphoplasty. C/o non  focal back pain and wants to know if she is permanently damaged. Most discomfort appears paraspinous likely from muscle irritation from surgery. Moves around well. OK for Dc from our standpoint. Brace only if she finds it comforting, but does NOT require it. She can shower at any time- no tub baths until incisions healed (2-3 weeks) Psyche following. We will see in f/u 2 weeks.      I discussed the patients findings and my recommendations with patient and nursing staff       LOS: 6 days       Yaima Morrissey, VAISHALI  10/2/2018  11:42 AM

## 2018-10-02 NOTE — PROGRESS NOTES
Case Management Discharge Note    Final Note: Home with spouse and [facility]    Destination     No service has been selected for the patient.      Durable Medical Equipment     No service has been selected for the patient.      Dialysis/Infusion     No service has been selected for the patient.      Home Medical Care     No service has been selected for the patient.      Social Care     No service has been selected for the patient.             Final Discharge Disposition Code: 06 - home with home health care

## 2018-10-02 NOTE — PROGRESS NOTES
Case Management Discharge Note    Final Note: Home with spouse and [facility]    Destination     No service has been selected for the patient.      Durable Medical Equipment     No service has been selected for the patient.      Dialysis/Infusion     No service has been selected for the patient.      Home Medical Care     No service has been selected for the patient.      Social Care     No service has been selected for the patient.        Other: Other (Private auto)    Final Discharge Disposition Code: 06 - home with home health care

## 2018-10-02 NOTE — DISCHARGE PLACEMENT REQUEST
"Sandra Chavez (68 y.o. Female)     Date of Birth Social Security Number Address Home Phone MRN    1950  [facility] [place] [id number] [phone] [id number]    Rastafari Marital Status          Synagogue        Admission Date Admission Type Admitting Provider Attending Provider Department, Room/Bed    9/26/18 Emergency Alea Andrews MD Ramaswamy, Rajanna B, MD [address], P595/1    Discharge Date Discharge Disposition Discharge Destination         Home or Self Care              Attending Provider:  Alea Andrews MD    Allergies:  No Known Allergies    Isolation:  None   Infection:  None   Code Status:  CPR    Ht:  167.6 cm (66\")   Wt:  70.6 kg (155 lb 10.3 oz)    Admission Cmt:  None   Principal Problem:  Closed fracture of twelfth thoracic vertebra (CMS/[facility]) [S22.089A]                 Active Insurance as of 9/26/2018     Primary Coverage     Payor Plan Insurance Group Employer/Plan Group    MEDICARE MEDICARE A & B      Payor Plan Address Payor Plan Phone Number Effective From Effective To    [address] [phone] 4/1/2013     [facility] [address]       Subscriber Name Subscriber Birth Date Member ID       SANDRA CHAVEZ 1950 [id number]                 Emergency Contacts      (Rel.) Home Phone Work Phone Mobile Phone    Immanuel Chavez (Spouse) [phone] -- [phone]              "

## 2018-10-02 NOTE — PROGRESS NOTES
Continued Stay Note  [facility]     Patient Name: Regina Chavez  MRN: [id number]  Today's Date: 10/2/2018    Admit Date: 9/26/2018          Discharge Plan     Row Name 10/02/18 1304       Plan    Plan Home w/ spouse and [facility].      Patient/Family in Agreement with Plan yes    Plan Comments d/c orders noted and home health was ordered.  Referral was called to Josephine swain/ [facility].                    Expected Discharge Date and Time     Expected Discharge Date Expected Discharge Time    Oct 2, 2018             Lynette Cardenas RN
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
## 2018-10-02 NOTE — PLAN OF CARE
Problem: Fall Risk (Adult)  Goal: Identify Related Risk Factors and Signs and Symptoms  Outcome: Ongoing (interventions implemented as appropriate)    Goal: Absence of Fall  Outcome: Ongoing (interventions implemented as appropriate)      Problem: Patient Care Overview  Goal: Plan of Care Review  Outcome: Ongoing (interventions implemented as appropriate)   10/02/18 0317   Coping/Psychosocial   Plan of Care Reviewed With patient   Plan of Care Review   Progress improving   OTHER   Outcome Summary pt alert with confusion. Frequent reminders of situation and when pain meds are due. Amb to br with assist x1 without diff. IV antibiotics continued. Will continue to monitor       Problem: Pain, Acute (Adult)  Goal: Acceptable Pain Control/Comfort Level  Outcome: Ongoing (interventions implemented as appropriate)      Problem: Skin Injury Risk (Adult)  Goal: Skin Health and Integrity  Outcome: Ongoing (interventions implemented as appropriate)      Problem: Urinary Tract Infection (Adult)  Goal: Signs and Symptoms of Listed Potential Problems Will be Absent, Minimized or Managed (Urinary Tract Infection)  Outcome: Ongoing (interventions implemented as appropriate)

## 2018-10-02 NOTE — TELEPHONE ENCOUNTER
----- Message from VAISHALI Jurado sent at 10/2/2018 11:54 AM EDT -----  Regarding: f/u  Pt seen for T12 VCF s/p kypho  by Dr. Orlin Warren on October 1, 2018. Need follow up in 2 week(s)  with imaging thoracic and lumbar (low back pain) xrays. Ok to see NP. Be sure and speak to family or nursing here. She is confused

## 2018-10-02 NOTE — TELEPHONE ENCOUNTER
Okay to use a new patient spot as this is all that is left with APRN or I have one HFU appointment for Monday 10/22 which will be almost three weeks? Please advise.

## 2018-10-03 ENCOUNTER — READMISSION MANAGEMENT (OUTPATIENT)
Dept: CALL CENTER | Facility: HOSPITAL | Age: 68
End: 2018-10-03

## 2018-10-03 ENCOUNTER — TELEPHONE (OUTPATIENT)
Dept: NEUROSURGERY | Facility: CLINIC | Age: 68
End: 2018-10-03

## 2018-10-03 LAB
CYTO UR: NORMAL
LAB AP CASE REPORT: NORMAL
LAB AP CLINICAL INFORMATION: NORMAL
PATH REPORT.FINAL DX SPEC: NORMAL
PATH REPORT.GROSS SPEC: NORMAL

## 2018-10-03 NOTE — TELEPHONE ENCOUNTER
Patient would like to know if she needs to do PT before her appt on 10/18?  Also please remind her that she needs to do x rays before her appt .    [place] [phone]

## 2018-10-03 NOTE — OUTREACH NOTE
Prep Survey      Responses   Facility patient discharged from?  [facility]   Is patient eligible?  Yes   Discharge diagnosis  T12 kyphoplasty   Does the patient have one of the following disease processes/diagnoses(primary or secondary)?  General Surgery   Does the patient have Home health ordered?  Yes   What is the Home health agency?   [facility]   Is there a DME ordered?  Yes   What DME was ordered?  brace   Comments regarding appointments  call for apmt   General alerts for this patient  ETOH   Prep survey completed?  Yes          Rosa Bowman RN

## 2018-10-03 NOTE — TELEPHONE ENCOUNTER
"S/P T12 kypho 10-1 by Dr. Warren.     Received call from home health nurse; she evaluated patient today who claimed her pain was \"8/10\".  Currently taking hydrocodone 7.5/325 one q8h and Robaxin 500 mg at same time. Hx of significant ETOH abuse.     After discussion w/Dr. Warren, do not advise increase in medications (risk of falls/oversedation given history). Can try taking additional Tylenol for pain.      nurse will advise patient of this.  "

## 2018-10-04 NOTE — TELEPHONE ENCOUNTER
Patient called today and request a cortisone shot in her back or more pain medication. I advised her that we do not give cortisone injections. I also advised her of the message below given to home health. She then said she didn't want more pain medication asked about adding another anti-inflammatory and I advised her that we do not typically give more than one at a time and she is already taking Robaxin. I advised her that Dr. Warren recommended she add Tylenol if she felt like she needed something additional. I asked her if she was overdoing it with activity and she said no, that she was only getting up from bed and walking some. She said that she would try adding the Tylenol.

## 2018-10-05 NOTE — TELEPHONE ENCOUNTER
I called Mrs. Chavez to advise her of Dr. Warren's message. She now states that she has no idea what happened to her pills her bottle is empty and she lost them. She and her  are looking for them. She wants to know if we will give Rx for something else. I advised her that we would not.

## 2018-10-05 NOTE — TELEPHONE ENCOUNTER
Per Dr. Warren- pts are given 10 day supply when leaving hospital so she should not need any yet. Also, if she is in that much pain that she needs more than she needs to FU with PCP or go to ER since we do not have availability to see her today.

## 2018-10-05 NOTE — TELEPHONE ENCOUNTER
Per Dr. Warren refill is not appropriate. We tried to call patients daughter Юлия Chavez  [phone] [facility] to call back per [facility] (He would like to speak with the patients daughter)

## 2018-10-05 NOTE — TELEPHONE ENCOUNTER
Pt is requesting refill on Hydrocodone 7.5/325mg she states she is taking 1q8 hrs and Robaxin 500mg qid.    She uses the [facility] pharmacy in [place].       [phone]

## 2018-10-08 ENCOUNTER — TELEPHONE (OUTPATIENT)
Dept: NEUROSURGERY | Facility: CLINIC | Age: 68
End: 2018-10-08

## 2018-10-08 ENCOUNTER — TELEPHONE (OUTPATIENT)
Dept: SOCIAL WORK | Facility: HOSPITAL | Age: 68
End: 2018-10-08

## 2018-10-08 NOTE — TELEPHONE ENCOUNTER
Pt has HFU appt with [facility] on 10/18.    Pt called today asking to confirm her appt with [facility] and I confirmed her appt. Pt seemed confused as she had asked me 3 different times what department she called.

## 2018-10-08 NOTE — TELEPHONE ENCOUNTER
Spoke w/pt about concerns. Reviewed record. D/c paperwork refers patient to [facility]- provided office number to patient as well. Advised could call back for any further concerns. Hina Bond RN

## 2018-10-08 NOTE — TELEPHONE ENCOUNTER
Patient called asking to confirm her appt for xray on 10/18 at 9:00. I explained that her appt is with the APRN with xray prior. She said she thought it was just for xray only. I advised her that no, it is to see the provider with xray prior to. I advised her that her appt is at 9:00 and that she should get xray about 8:00.

## 2018-10-09 ENCOUNTER — READMISSION MANAGEMENT (OUTPATIENT)
Dept: CALL CENTER | Facility: HOSPITAL | Age: 68
End: 2018-10-09

## 2018-10-09 NOTE — OUTREACH NOTE
General Surgery Week 1 Survey      Responses   Facility patient discharged from?  [facility]   Does the patient have one of the following disease processes/diagnoses(primary or secondary)?  General Surgery   Is there a successful TCM telephone encounter documented?  No   Week 1 attempt successful?  No   Unsuccessful attempts  Attempt 1          Hina Heard RN

## 2018-10-11 ENCOUNTER — READMISSION MANAGEMENT (OUTPATIENT)
Dept: CALL CENTER | Facility: HOSPITAL | Age: 68
End: 2018-10-11

## 2018-10-11 NOTE — OUTREACH NOTE
General Surgery Week 1 Survey      Responses   Facility patient discharged from?  [facility]   Does the patient have one of the following disease processes/diagnoses(primary or secondary)?  General Surgery   Is there a successful TCM telephone encounter documented?  No   Week 1 attempt successful?  No   Unsuccessful attempts  Attempt 2          Norma Conteh RN

## 2018-10-15 ENCOUNTER — READMISSION MANAGEMENT (OUTPATIENT)
Dept: CALL CENTER | Facility: HOSPITAL | Age: 68
End: 2018-10-15

## 2018-10-15 NOTE — OUTREACH NOTE
General Surgery Week 2 Survey      Responses   Facility patient discharged from?  [facility]   Does the patient have one of the following disease processes/diagnoses(primary or secondary)?  General Surgery   Week 2 attempt successful?  Yes   Call start time  1244   Call end time  1246   Discharge diagnosis  T12 kyphoplasty   Meds reviewed with patient/caregiver?  Yes   Is the patient taking all medications as directed (includes completed medication regime)?  Yes   Has the patient kept scheduled appointments due by today?  N/A   What is the Home health agency?   [facility]   Has home health visited the patient within 72 hours of discharge?  Yes   Did the patient receive a copy of their discharge instructions?  Yes   Nursing interventions  Reviewed instructions with patient   What is the patient's perception of their health status since discharge?  Improving   Is the patient /caregiver able to teach back basic post-op care?  Practice 'cough and deep breath', Drive as instructed by MD in discharge instructions, Take showers only when approved by MD-sponge bathe until then, Keep incision areas clean,dry and protected   Is the patient/caregiver able to teach back steps to recovery at home?  Rest and rebuild strength, gradually increase activity   Additional teach back comments  pt says she is better. Will see Dr later this wk. Incision looks good per family membert. Pt moving around ok   Week 2 call completed?  Yes          Lindsey Hurtado RN

## 2018-10-18 ENCOUNTER — HOSPITAL ENCOUNTER (OUTPATIENT)
Dept: GENERAL RADIOLOGY | Facility: HOSPITAL | Age: 68
Discharge: HOME | End: 2018-10-18

## 2018-10-18 ENCOUNTER — HOSPITAL ENCOUNTER (OUTPATIENT)
Dept: GENERAL RADIOLOGY | Facility: HOSPITAL | Age: 68
Discharge: HOME | End: 2018-10-18
Admitting: NURSE PRACTITIONER

## 2018-10-18 ENCOUNTER — OFFICE VISIT (OUTPATIENT)
Dept: NEUROSURGERY | Facility: CLINIC | Age: 68
End: 2018-10-18

## 2018-10-18 ENCOUNTER — TELEPHONE (OUTPATIENT)
Dept: NEUROSURGERY | Facility: CLINIC | Age: 68
End: 2018-10-18

## 2018-10-18 VITALS
DIASTOLIC BLOOD PRESSURE: 62 MMHG | HEART RATE: 84 BPM | WEIGHT: 146 LBS | HEIGHT: 66 IN | SYSTOLIC BLOOD PRESSURE: 110 MMHG | BODY MASS INDEX: 23.46 KG/M2

## 2018-10-18 DIAGNOSIS — S22.080D CLOSED WEDGE COMPRESSION FRACTURE OF TWELFTH THORACIC VERTEBRA WITH ROUTINE HEALING, SUBSEQUENT ENCOUNTER: Primary | ICD-10-CM

## 2018-10-18 DIAGNOSIS — M54.5 LOW BACK PAIN, UNSPECIFIED BACK PAIN LATERALITY, UNSPECIFIED CHRONICITY, WITH SCIATICA PRESENCE UNSPECIFIED: ICD-10-CM

## 2018-10-18 PROCEDURE — 99213 OFFICE O/P EST LOW 20 MIN: CPT | Performed by: NURSE PRACTITIONER

## 2018-10-18 PROCEDURE — 72070 X-RAY EXAM THORAC SPINE 2VWS: CPT

## 2018-10-18 PROCEDURE — 72100 X-RAY EXAM L-S SPINE 2/3 VWS: CPT

## 2018-10-18 NOTE — TELEPHONE ENCOUNTER
From our standpoint, she is doing well and is stable.  She had kyphoplasty at T12 for an acute compression fracture status post fall.  X-ray imaging revealed stable findings.  She has muscular discomfort that has been present since the fall.  I explained that this will take time to improve.  She can treat this conservatively with over-the-counter medications.  She does not need epidurals and I would certainly not recommend Celebrex.  She needs no ongoing follow-up.

## 2018-10-18 NOTE — TELEPHONE ENCOUNTER
Patient called office, requested clarification on PT, wants to know why we can't just order PT. Informed that per [facility] OV note and phone note here that patient is stable relating to the VCF that she had the kypho on.  PT that could be helpful would deal with her balance and gait to ensure that she does not have another fall.  Because of stable imaging, no need to f/u with our office, voiced understanding, will discuss with her PCP.

## 2018-10-18 NOTE — TELEPHONE ENCOUNTER
Maria Del Carmen, what can I tell this patient? Her note isn't finished so I am not sure what was discussed.    Thanks,

## 2018-10-18 NOTE — PROGRESS NOTES
"Subjective   Patient ID: Regina Chavez is a 68 y.o. female who is here today for follow-up for a T12 kyphoplasty on 10/1/18. She had lumbar and thoracic xrays today at [facility]. She presents unaccompanied.    History of Present Illness     She presents to the office today for first hospital follow-up status post T12 kyphoplasty on October 1, 2018.  She has had lumbar and thoracic x-rays.  The fracture occurred as result of a fall.  She has a history of heavy alcohol abuse.    She states that she has been wearing the brace is being discharged home from the hospital.  She denies any new falls.  She also denies any balance or gait issues.  She is being much more careful overall with walking.  She denies back pain with the exception of muscular tenderness and discomfort.  The pain at the fracture site has resolved since the kyphoplasty.    She presents unaccompanied.        /62 (BP Location: Left arm, Patient Position: Sitting, Cuff Size: Adult)   Pulse 84   Ht 167.6 cm (66\")   Wt 66.2 kg (146 lb)   BMI 23.57 kg/m²     The following portions of the patient's history were reviewed and updated as appropriate: allergies, current medications, past family history, past medical history, past social history, past surgical history and problem list.    Review of Systems   Constitutional: Negative for chills and fever.   Gastrointestinal: Negative for constipation.   Genitourinary: Negative for difficulty urinating and enuresis.   Musculoskeletal: Positive for back pain.   Skin: Negative for wound (Denies wound redness, swelling, or drainage ).   Neurological: Negative for weakness, numbness and headaches.   Psychiatric/Behavioral: Negative for sleep disturbance.       Objective   Physical Exam   Constitutional: She is oriented to person, place, and time. Vital signs are normal. She appears well-developed and well-nourished. She is cooperative.  Non-toxic appearance. She does not have a sickly appearance. She does " not appear ill.   Very pleasant, well-appearing older female   HENT:   Head: Normocephalic and atraumatic.   Eyes: EOM are normal.   Neck: Neck supple. No tracheal deviation present.   Pulmonary/Chest: Effort normal.   Abdominal: Soft.   Musculoskeletal: Normal range of motion. She exhibits no edema, tenderness (Nontender to firm palpation T12 bony prominence, minimal tenderness surrounding paraspinal musculature) or deformity.        Lumbar back: She exhibits normal range of motion, no tenderness and no bony tenderness.   SAENZ well  Strength equal throughout   Neurological: She is alert and oriented to person, place, and time. She has normal strength. She displays no tremor and normal reflexes. No sensory deficit. Coordination and gait normal. GCS eye subscore is 4. GCS verbal subscore is 5. GCS motor subscore is 6.   Gait is stable and upright   Skin: Skin is warm and dry.   Psychiatric: She has a normal mood and affect. Her behavior is normal. Thought content normal.   Vitals reviewed.    Neurologic Exam     Mental Status   Oriented to person, place, and time.     Cranial Nerves     CN III, IV, VI   Extraocular motions are normal.     Motor Exam     Strength   Strength 5/5 throughout.       Assessment/Plan   Independent Review of Radiographic Studies:    Thoracic and lumbar x-rays reveal stable findings with kyphoplasty cement seen at the T12 vertebrae in optimal position.  No acute thoracic or lumbar abnormalities are identified.      Medical Decision Making:    She returns the office today for first hospital follow-up status post fall resulting in an acute T12 compression fracture.  Exam as noted above, no red flags.  She underwent kyphoplasty with Dr. Warren on October 1 for surgical treatment of the fracture.  She reported almost complete resolution of the acute low back pain postop.  She was instructed in the hospital that she did not have to wear the TLSO brace, except for comfort only.  She states that she  has been wearing the brace at all times.  I instructed her to wean out of the brace.  Thoracic and lumbar x-rays reveal stable findings with intact kyphoplasty cement at the T12 vertebrae and with no other abnormalities.  I encouraged ongoing caution with walking.  She might benefit from additional physical therapy to help with balance and gait issues.  She can discuss this with her primary care provider.  From our standpoint she is stable and doing well.  She has no pain at the T12 bony prominence.  I explained that muscular discomfort secondary to trauma status post fall months to get better.  She can treat this conservatively with Tylenol and over-the-counter anti-inflammatories.    Plan: Discontinue brace, return to office as needed  [place] was seen today for post-op.    Diagnoses and all orders for this visit:    Closed wedge compression fracture of twelfth thoracic vertebra with routine healing, subsequent encounter      Return if symptoms worsen or fail to improve.

## 2018-10-18 NOTE — TELEPHONE ENCOUNTER
Patient was seen today for fracture T12 kyphoplasty.     Pt is calling,  since her appt today and wants to know if epidural injection and celebrex will help.  This is the 2nd time pt has called.     The first time she called she wanted to know when her appointment with Dr Warren is.  Gina had to let her know that she does not have an appt with him.

## 2018-10-23 ENCOUNTER — READMISSION MANAGEMENT (OUTPATIENT)
Dept: CALL CENTER | Facility: HOSPITAL | Age: 68
End: 2018-10-23

## 2018-10-23 NOTE — OUTREACH NOTE
General Surgery Week 3 Survey      Responses   Facility patient discharged from?  [facility]   Does the patient have one of the following disease processes/diagnoses(primary or secondary)?  General Surgery   Week 3 attempt successful?  Yes   Call start time  1203   Rescheduled  Rescheduled-pt requested   Call end time  1204   General alerts for this patient  ETOH   Discharge diagnosis  T12 kyphoplasty          Charla Case RN

## 2019-12-03 ENCOUNTER — OFFICE VISIT (OUTPATIENT)
Dept: SLEEP MEDICINE | Facility: HOSPITAL | Age: 69
End: 2019-12-03

## 2019-12-03 VITALS
HEART RATE: 84 BPM | WEIGHT: 161 LBS | BODY MASS INDEX: 25.88 KG/M2 | SYSTOLIC BLOOD PRESSURE: 141 MMHG | OXYGEN SATURATION: 98 % | HEIGHT: 66 IN | DIASTOLIC BLOOD PRESSURE: 84 MMHG

## 2019-12-03 DIAGNOSIS — G47.33 OBSTRUCTIVE SLEEP APNEA, ADULT: Primary | ICD-10-CM

## 2019-12-03 DIAGNOSIS — F51.04 CHRONIC INSOMNIA: ICD-10-CM

## 2019-12-03 PROCEDURE — G0463 HOSPITAL OUTPT CLINIC VISIT: HCPCS

## 2019-12-03 NOTE — PROGRESS NOTES
"            [facility]      Patient Care Team:  Regulo Nugent MD as PCP - General  Regulo Nugent MD as PCP - Family Medicine  Gildardo Prajapati MD as PCP - Claims Attributed  Catalino Sanchez DO as Consulting Physician (Neurology)    Referring Provider: Regulo Nugent MD    Chief complaint:   Insomnia    History of present illness:  Subjective   This is a 69-year-old female patient with history of hypertension.  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  She retired about 3 years ago.    She reported difficulty sleeping since then.  She has tried Ambien in the past and used 10 mg which helped her. She tried Trazodone and Mirtazapine without help.      She stated that she had a sleep study in 2019 at [facility] which showed sleep apnea and she was prescribed CPAP but she wanted to get another opinion.  She did sleep well during the test when she was provided with Ambien.      Sleep schedule:  -Bedtime: Midnight- she does not feel sleepy.  -Sleep latency: Does not sleep- She reads books, newspaper and \"try to stay off computer\". Tosses and turn. Light off. No noise. Temperature ok.   -Wake up time: 7 AM , does feel refreshed  -Nocturnal awakenin-3 times because of nocturia.  No difficulties going back to sleep.  -Daytime naps: None    ESS: Total score: 0     She does not exercise and does not have any special diet.  Weight has increased by 15 pounds over the last 5 years.  She is concerned that insomnia is causing her to gain weight.    She reported symptoms of choking and coughing at night which she attributed to hiatal hernia and she is currently in the process of having a hiatal hernia surgery.    Review of Systems  Constitutional: No fever or chills. No changes in appetite.   ENMT: No sinus congestion, postnasal drip but persistent hoarseness  Cardiovascular: No chest pain, palpitation or legs " swelling.    Respiratory: No dyspnea or wheezing but reported cough  Gastrointestinal: No constipation, diarrhea, abdominal pain or acid reflux  Neurology: No headache, weakness, numbness or dizziness.   Musculoskeletal: No joints pain, stiffness or swelling.   Psychiatry: No depression, anxiety or irritability.   Hem/Lymphatic: No swollen glands or easy bruising.  Integumentary: No rash.  Endocrinology: No excessive thirst, cold or warm intolerance.   Urinary: No dysuria, bloody urine or frequent urination.     History  Past Medical History:   Diagnosis Date   • Anxiety    • Arthritis of knee    • Heme + stool    • History of alcohol abuse    • History of drug abuse    • Hypertension    • Osteopenia    • RLQ abdominal pain    ,   Past Surgical History:   Procedure Laterality Date   • APPENDECTOMY     • BREAST IMPLANT SURGERY     •  SECTION     • CHOLECYSTECTOMY     • KNEE SURGERY     • KYPHOPLASTY Bilateral 10/1/2018    Procedure: KYPHOPLASTY WITH POSSIBLE BIOPSY, THORACIC 12;  Surgeon: Orlin Warren IV, MD;  Location: [facility] ;  Service: Neurosurgery   ,   Family History   Problem Relation Age of Onset   • Hip fracture Mother    • Hypertension Mother    • Heart failure Mother    • Osteoporosis Mother    • Heart failure Father    • Hypertension Father    ,   Social History     Tobacco Use   • Smoking status: Never Smoker   • Smokeless tobacco: Never Used   Substance Use Topics   • Alcohol use: Yes     Comment: nightly   • Drug use: Yes     Types: Other     Comment: AMBIEN   ,   (Not in a hospital admission) and Allergies:  Patient has no known allergies.    Medications:    Current Outpatient Medications:   •  alendronate (FOSAMAX) 70 MG tablet, Take 1 tablet by mouth Every 7 (Seven) Days., Disp: 12 tablet, Rfl: 3  •  amLODIPine (NORVASC) 5 MG tablet, Take 5 mg by mouth daily., Disp: , Rfl:   •  Calcium Citrate-Vitamin D (CALCIUM + D PO), Take  by mouth Daily., Disp: , Rfl:   •  folic acid  "(FOLVITE) 1 MG tablet, Take 1 tablet by mouth Daily., Disp: , Rfl:   •  losartan (COZAAR) 100 MG tablet, Take 100 mg by mouth daily., Disp: , Rfl:   •  methocarbamol (ROBAXIN) 500 MG tablet, Take 1 tablet by mouth Every 6 (Six) Hours As Needed for Muscle Spasms., Disp: 20 tablet, Rfl: 0  •  mirtazapine (REMERON) 7.5 MG tablet, Take 1 tablet by mouth Every Night., Disp: 30 tablet, Rfl: 0  •  Multiple Vitamins-Minerals (WOMENS 50+ MULTI VITAMIN/MIN PO), Take  by mouth., Disp: , Rfl:   •  thiamine (VITAMIN B1) 100 MG tablet, Take 1 tablet by mouth Daily., Disp: , Rfl:   •  vilazodone (VIIBRYD) 10 MG tablet tablet, Take 1 tablet by mouth Daily., Disp: 30 tablet, Rfl: 0  •  vitamin B-12 (CYANOCOBALAMIN) 100 MCG tablet, Take 50 mcg by mouth., Disp: , Rfl:       Objective   Vital Signs:  Vitals:    12/03/19 1355   BP: 141/84   Pulse: 84   SpO2: 98%   Weight: 73 kg (161 lb)   Height: 167.6 cm (66\")     Body mass index is 25.99 kg/m².  Neck Circumference: 15 inches     Physical Exam:  Neck Circumference: 15 inches     Constitutional: Not in acute distress.  Eyes: Injected conjunctiva, EOMI. pupils equal reactive to light.  ENMT: Moody score 3. Mallampati score 3. No oral thrush. Tonsils grade 1. Narrow distance in between the posterior pharyngeal pillars. Scalloping of the tongue.  Neck: Large. No thyromegaly.  Trachea midline.  Heart: Regular rhythm and rate, no murmur  Lungs: Good and equal air entry bilaterally. No crackles or wheezing.  Nonlabored breathing.       Abdomen: Overweight.  Soft.  No tenderness.  Positive bowel sounds.  Extremities: No cyanosis, clubbing or pitting edema.  Warm extremities and well-perfused.  Neuro: Conscious, alert, oriented x3.  Gait is normal.  Strength 5/5 in arms and legs.  Psych: Appropriate mood and affect.    Integumentary: No rash.  Normal skin turgor.  Lymphatic: No palpable cervical or supraclavicular lymph nodes.      Assessment   1. DOMINICK  2. Insomnia, chronic. Definite " paradoxical component.   3. Overweight  4. Essential hypertension      Plan:  We discussed the pathophysiology of sleep apnea, testing and therapy which include CPAP and weight loss.  Patient is agreeable with CPAP therapy if needed.    Discussed sleep hygiene.     Counseled for weight loss.  Encouraged to exercise regularly and cut down on carbohydrates.  Discussed that losing weight may decrease the severity of sleep apnea and obviate the need of CPAP therapy.    Discussed the association between sleep apnea and cardiovascular disease including HTN. I recommended therapy for sleep apnea but I do not have her sleep test so I asked her to bring in the study.         Melisa Rabago MD  12/03/19  1:58 PM    This note was dictated utilizing Dragon dictation

## 2020-04-08 ENCOUNTER — OFFICE VISIT (OUTPATIENT)
Dept: NEUROSURGERY | Facility: CLINIC | Age: 70
End: 2020-04-08

## 2020-04-08 DIAGNOSIS — G89.29 CHRONIC MIDLINE LOW BACK PAIN WITHOUT SCIATICA: Primary | ICD-10-CM

## 2020-04-08 DIAGNOSIS — M54.50 CHRONIC MIDLINE LOW BACK PAIN WITHOUT SCIATICA: Primary | ICD-10-CM

## 2020-04-08 PROCEDURE — 99213 OFFICE O/P EST LOW 20 MIN: CPT | Performed by: NURSE PRACTITIONER

## 2020-04-08 RX ORDER — FAMOTIDINE 40 MG/1
40 TABLET, FILM COATED ORAL DAILY
COMMUNITY
Start: 2019-11-21 | End: 2020-06-09

## 2020-04-08 RX ORDER — CHLORAL HYDRATE 500 MG
CAPSULE ORAL
COMMUNITY
End: 2020-06-09

## 2020-04-08 RX ORDER — LISINOPRIL AND HYDROCHLOROTHIAZIDE 25; 20 MG/1; MG/1
1 TABLET ORAL DAILY
COMMUNITY
End: 2020-06-09

## 2020-04-08 RX ORDER — ERGOCALCIFEROL 1.25 MG/1
50000 CAPSULE ORAL
COMMUNITY
Start: 2019-11-11 | End: 2020-06-09

## 2020-04-08 RX ORDER — IBANDRONATE SODIUM 150 MG/1
150 TABLET, FILM COATED ORAL
COMMUNITY
Start: 2020-03-16 | End: 2021-03-16

## 2020-04-08 RX ORDER — ESZOPICLONE 2 MG/1
TABLET, FILM COATED ORAL
COMMUNITY
Start: 2020-03-18

## 2020-04-08 RX ORDER — PANTOPRAZOLE SODIUM 40 MG/1
40 TABLET, DELAYED RELEASE ORAL
COMMUNITY
Start: 2019-08-26 | End: 2020-06-09

## 2020-04-08 RX ORDER — CELECOXIB 200 MG/1
200 CAPSULE ORAL DAILY
COMMUNITY
Start: 2019-02-04

## 2020-04-15 ENCOUNTER — TELEPHONE (OUTPATIENT)
Dept: NEUROSURGERY | Facility: CLINIC | Age: 70
End: 2020-04-15

## 2020-04-15 NOTE — TELEPHONE ENCOUNTER
Pt CALLED INQUIRING ABOUT RECEIVING INJECTIONS. LAST OV WAS 4/8.  SHE WOULD LIKE TO KNOW IF SHE CAN START TAKING INJECTIONS.  PLEASE ADVISE PATIENT    [phone]

## 2020-04-16 DIAGNOSIS — G89.29 CHRONIC MIDLINE LOW BACK PAIN WITHOUT SCIATICA: Primary | ICD-10-CM

## 2020-04-16 DIAGNOSIS — M54.50 CHRONIC MIDLINE LOW BACK PAIN WITHOUT SCIATICA: Primary | ICD-10-CM

## 2020-04-16 NOTE — TELEPHONE ENCOUNTER
Patient states that she is feelign better with Physical therapy. I will send referral to Dr. Washington office. She is aware that it will be after June that she will be seen.

## 2020-04-16 NOTE — TELEPHONE ENCOUNTER
Please let her know that I have ordered a referral to pain management.  However, given the coronavirus, I am not sure if there are any offices that are accepting new patients at this time.  Also, there is a possibility that her insurance may require an MRI of her lumbar spine before approving the injections.    Sheri, I do not know who is scheduling referrals for patients since [facility] is off...

## 2020-05-01 ENCOUNTER — TELEPHONE (OUTPATIENT)
Dept: NEUROSURGERY | Facility: CLINIC | Age: 70
End: 2020-05-01

## 2020-05-04 ENCOUNTER — TELEPHONE (OUTPATIENT)
Dept: NEUROSURGERY | Facility: CLINIC | Age: 70
End: 2020-05-04

## 2020-05-04 DIAGNOSIS — G89.29 CHRONIC MIDLINE LOW BACK PAIN WITHOUT SCIATICA: Primary | ICD-10-CM

## 2020-05-04 DIAGNOSIS — M54.50 CHRONIC MIDLINE LOW BACK PAIN WITHOUT SCIATICA: Primary | ICD-10-CM

## 2020-05-04 NOTE — TELEPHONE ENCOUNTER
I have her setup for Lumbar MRI at [facility] tomorrow 05/05. She needs to be there by 10:15 the [facility] location. She is scheduled to discuss the results over via telephone vis on 05/08 @10:15 with Maria Del Carmen Rae. We are limiting the amount of patient we have come into the office due to the COVID-19. Please advise. .

## 2020-05-04 NOTE — TELEPHONE ENCOUNTER
I called and spoke to patient. She is having no leg pain, no numbness, tingling or weakness. On a scale of 0-10, she rates her pain an 8. She has medicare insurance, so she does not need an auth.

## 2020-05-04 NOTE — TELEPHONE ENCOUNTER
You sent a referral for pain management for this patient. Dr. Rashid's office called on 04/21/20 and MS. Chavez told them her back was feeling better. Dr. Rashid's office told them that the order is good for 1 year and that if she starts to feel bad then she can call back to be scheduled.  called our office on 05/01/20 and with complaints of increased back pain. I re-routed the referral to Dr. Rashid's office on 05/0. Patient is requesting an MRI. She was last seen on 04/08/20

## 2020-05-04 NOTE — TELEPHONE ENCOUNTER
OK, I will order lumbar MRI and we can arrange an office visit after wards. Can be with any of the midlevels.

## 2020-05-04 NOTE — TELEPHONE ENCOUNTER
PT CALLED AND STATES THAT SHE IS FOLLOWING UP ON HER PLAN OF CARE/STATES THAT SHE IS IN A LOT OF PAIN THIS MORNING/STATES THAT SHE FINISHED PHYSICAL THERAPY 1-2WKS AGO AND IT DID NOT HELP/PT IS REQUESTING AN MRI AND WOULD LIKE FOR SOMEONE TO CONTACT HER -[phone] AS SOON AS POSSIBLE.    THANK YOU!

## 2020-05-04 NOTE — TELEPHONE ENCOUNTER
IS the pain in her back only or is she now c/o leg pain too? I would encourage her to call PM for an appt. If her pain is that much worse, I can order an MRI of the lumbar spine, but will need justification for doing so (ie new sx's, problems, etc.) to get it approved.

## 2020-05-05 DIAGNOSIS — G89.29 CHRONIC MIDLINE LOW BACK PAIN WITHOUT SCIATICA: ICD-10-CM

## 2020-05-05 DIAGNOSIS — M54.50 CHRONIC MIDLINE LOW BACK PAIN WITHOUT SCIATICA: ICD-10-CM

## 2020-05-05 NOTE — TELEPHONE ENCOUNTER
Cait told her she needed and a sacral MRI. She stated that was where most of her pain was. I told her that she told us her back was bothering us. She confirmed appointment for Friday.

## 2020-05-05 NOTE — TELEPHONE ENCOUNTER
Patient called the office after having her lumbar mri and said she was suppose to get a mri of the sacral area.

## 2020-05-05 NOTE — PROGRESS NOTES
"Subjective     History of Present Illness     History of Present Illness: Regina Chavez is a 70 y.o. female is here today for follow-up in the office to discuss L-spine MRI results. Ms. Chavez contacted the office on 05/04/20 with complaints of severe back pain.   Patient was last seen 4.8.20 for back pain and was sent for PT which she states that it did not help.    She had T12 kyphoplasty with Dr Warren 10.18.18    Patient is currently having constant moderate to severe low back pain, she denies leg weakness, leg pain, she denies N/T, urinary incontinence or problems with her balance and gait.  Patient states that her symptoms started about a month ago without cause.  She is not taking any pain meds, but is on Boniva for osteroporosis.  She was also referred to pain management and is pending a tentative visit with Dr. Rashid tomorrow.    Today, she denies any pain in her low back or legs.  She just reports a \"stiffness.\"  The stiffness is mostly in the left buttock and hip area.  She states that this worsens with prolonged walking and going up steps.  She also denies any numbness, tingling or weakness in her legs.  She denies any balance or gait instability.  She reiterates that she is not in pain.  She also denies any bowel or bladder problems.            The following portions of the patient's history were reviewed and updated as appropriate: allergies, current medications, past family history, past medical history, past social history, past surgical history and problem list.    Review of Systems   Constitutional: Negative.    HENT: Negative.    Eyes: Negative.    Respiratory: Negative.    Cardiovascular: Negative.    Gastrointestinal: Negative.    Endocrine: Negative.    Genitourinary: Negative for urgency.   Musculoskeletal: Positive for arthralgias and back pain. Negative for gait problem, joint swelling and myalgias.   Allergic/Immunologic: Negative.    Neurological: Negative for weakness and numbness. " "  Hematological: Negative.    Psychiatric/Behavioral: Negative.        Objective     Vitals:    05/08/20 1031   BP: 128/74   Pulse: 76   Temp: 98.4 °F (36.9 °C)   TempSrc: Tympanic   Weight: 72.1 kg (159 lb)   Height: 167.6 cm (65.98\")     Body mass index is 25.68 kg/m².      Physical Exam   Constitutional: She is oriented to person, place, and time. Vital signs are normal. She appears well-developed and well-nourished. She is cooperative.  Non-toxic appearance. She does not have a sickly appearance. She does not appear ill.   Pleasant well-appearing older female   HENT:   Head: Normocephalic.   Eyes:   Corrective lenses   Neck: Neck supple.   Pulmonary/Chest: Effort normal.   Musculoskeletal: She exhibits tenderness (Very tender minimal palpation left [place], otherwise no tenderness to firm palpation thoracic and lumbar spine as well as paraspinal musculature). She exhibits no deformity.        Lumbar back: She exhibits tenderness. She exhibits normal range of motion, no bony tenderness and no pain.   Full lumbar range of motion, minimal pain with left lateral flexion  Strength intact throughout   Neurological: She is alert and oriented to person, place, and time. She has normal strength. She displays no tremor. Gait normal. GCS eye subscore is 4. GCS verbal subscore is 5. GCS motor subscore is 6.   Gait is stable and upright, nonantalgic   Skin: Skin is warm and dry.   Psychiatric: She has a normal mood and affect. Her behavior is normal. Thought content normal.   Vitals reviewed.    Neurologic Exam     Mental Status   Oriented to person, place, and time.     Motor Exam     Strength   Strength 5/5 throughout.           Assessment/Plan   Independent Review of Radiographic Studies:      I personally reviewed the images from the following studies.    Lumbar MRI from [facility] dated Dated May 5, 2020 reveals chronic compression deformity at T12 status post kyphoplasty.  Degenerative changes are seen at multiple levels " "including disc desiccation at L1-2 and L2-3.  At L4-5 there is disc desiccation and moderate bilateral facet hypertrophy with facet joint effusions.  There are also bilateral synovial cyst, right measures 3 x 7.5 x 13 mm and the left measures 7 x 9 x 16 mm.  Both contribute to severe spinal stenosis.  Grade 1 anterior listhesis is also seen at this level and there is foraminal narrowing that is moderate bilateral, left greater than right.    Medical Decision Making:    She presents the office today for further evaluation of history of low back pain and current complaints of low back stiffness.  Exam as noted above, no red flags.  She is stable on exam with normal strength and gait.  She has no pain complaints.  She has just very minimal muscular tightness in the left gluteus region to firm palpation.  She also has normal range of motion.  I reviewed the lumbar MRI at length but given the fact that she has no radicular myelopathic exam findings or symptomatology I stated that there is nothing to do from our standpoint.  I did recommend a referral to physical therapy given the \"stiffness\" that she continues to have in her low back.  From our standpoint we will see her back on an as-needed basis.  She is to call at anytime with any questions or concerns.    Plan: Referral to PT, return to office as needed    [place] was seen today for back pain.    Diagnoses and all orders for this visit:    Chronic midline low back pain without sciatica  -     Ambulatory Referral to Physical Therapy Evaluate and treat; Stretching, ROM, Strengthening; Full weight bearing      Return if symptoms worsen or fail to improve.         "

## 2020-05-05 NOTE — TELEPHONE ENCOUNTER
I only ordered a lumbar MRI, there was no reason to get a sacral MRI. I will call her at the previously arranged tele-health visit.

## 2020-05-08 ENCOUNTER — TELEPHONE (OUTPATIENT)
Dept: NEUROSURGERY | Facility: CLINIC | Age: 70
End: 2020-05-08

## 2020-05-08 ENCOUNTER — OFFICE VISIT (OUTPATIENT)
Dept: NEUROSURGERY | Facility: CLINIC | Age: 70
End: 2020-05-08

## 2020-05-08 VITALS
WEIGHT: 159 LBS | HEIGHT: 66 IN | TEMPERATURE: 98.4 F | HEART RATE: 76 BPM | DIASTOLIC BLOOD PRESSURE: 74 MMHG | BODY MASS INDEX: 25.55 KG/M2 | SYSTOLIC BLOOD PRESSURE: 128 MMHG

## 2020-05-08 DIAGNOSIS — M54.50 CHRONIC MIDLINE LOW BACK PAIN WITHOUT SCIATICA: Primary | ICD-10-CM

## 2020-05-08 DIAGNOSIS — G89.29 CHRONIC MIDLINE LOW BACK PAIN WITHOUT SCIATICA: Primary | ICD-10-CM

## 2020-05-08 PROCEDURE — 99213 OFFICE O/P EST LOW 20 MIN: CPT | Performed by: NURSE PRACTITIONER

## 2020-05-08 NOTE — TELEPHONE ENCOUNTER
"We had absolutely no discussion regarding any specific orthopedist.  I simply said that if her \"pelvis\" pain continues, especially since she was totally stable from our standpoint, she may consider seeing an orthopedist.  I would have her get a referral from her PCP.  "

## 2020-05-08 NOTE — TELEPHONE ENCOUNTER
I do not see anything in your note from today where you mentioned referring her to an orthopedic doctor    Please advise

## 2020-05-08 NOTE — TELEPHONE ENCOUNTER
PT WAS SEEN TODAY IN OFFICE BY JIGAR RODRIGUEZ AND SHE RECOMMENDED HER TO SEE AN ORTHOPEDIC AND SHE WOULD LIKE TO KNOW THE NAME OF THE DR SHE WOULD LIKE FOR HER TO SEE FOR HER LOW BACK.    PT CONTACT# [phone]  BEST TIME TO CALL: ANYTIME

## 2020-05-11 ENCOUNTER — TELEPHONE (OUTPATIENT)
Dept: NEUROSURGERY | Facility: CLINIC | Age: 70
End: 2020-05-11

## 2020-05-11 NOTE — TELEPHONE ENCOUNTER
Your telephone note states that she is to get an ortho referral from PCP if pain does not improve. I do not see anything that mentions a new MRI of anything. Patient was last seen on 05/08/20

## 2020-05-11 NOTE — TELEPHONE ENCOUNTER
Patient called wanted to know why her pcp needed to order an mri.  I told the patient that she would need to contact her pcp if the pain doesn't get better and needs to see an orthopaedic doctor.  The patient stated no she was going to order an mri.  I informed the patient that I looked at her last office note didn't mention anything regarding getting a new mri and that she had ordered physical therapy.  The patient stated she was not going to start physical therapy until she gets a new mri.

## 2020-05-12 NOTE — TELEPHONE ENCOUNTER
I did not mention anything specifically about referring her to an orthopedist or about ordering an MRI.  I simply stated that she is to talk to her primary care provider about this ongoing left-sided hip pain.  We do not order MRIs to evaluate for hip issues, I explained this at length to the patient.  She was clearly confused regarding much of our conversation.

## 2020-05-12 NOTE — TELEPHONE ENCOUNTER
I explained to patient that for her ongoing left sided hip pain she will need to contact her PCP in regard to this because we do no treat for this. They can order further imaging or refer her to an ortho. She thought she needed an sacral MRI. She voiced understanding.

## 2020-05-14 ENCOUNTER — DOCUMENTATION (OUTPATIENT)
Dept: PAIN MEDICINE | Facility: CLINIC | Age: 70
End: 2020-05-14

## 2020-05-14 NOTE — PROGRESS NOTES
Patient was a no call no show for her video visit today.  Furthermore I was unable to reach her on the phone.    I found out from our office staff that she called our office yesterday and canceled her visit for today, and then called our office back this morning with a request to have her appointment restored and that she wanted to be seen, and my office staff complied with her request.    We will be happy to evaluate her if she wants to be seen.

## 2020-06-09 ENCOUNTER — OFFICE VISIT (OUTPATIENT)
Dept: PAIN MEDICINE | Facility: CLINIC | Age: 70
End: 2020-06-09

## 2020-06-09 VITALS
RESPIRATION RATE: 18 BRPM | HEIGHT: 61 IN | HEART RATE: 54 BPM | WEIGHT: 149.2 LBS | TEMPERATURE: 98 F | SYSTOLIC BLOOD PRESSURE: 140 MMHG | DIASTOLIC BLOOD PRESSURE: 96 MMHG | BODY MASS INDEX: 28.17 KG/M2 | OXYGEN SATURATION: 99 %

## 2020-06-09 DIAGNOSIS — M79.18 PIRIFORMIS MUSCLE PAIN: ICD-10-CM

## 2020-06-09 DIAGNOSIS — G89.29 OTHER CHRONIC PAIN: ICD-10-CM

## 2020-06-09 DIAGNOSIS — M53.3 SACROILIAC JOINT DYSFUNCTION: Primary | ICD-10-CM

## 2020-06-09 LAB
POC AMPHETAMINES: NEGATIVE
POC BARBITURATES: NEGATIVE
POC BENZODIAZEPHINES: NEGATIVE
POC COCAINE: NEGATIVE
POC METHADONE: NEGATIVE
POC METHAMPHETAMINE SCREEN URINE: NEGATIVE
POC OPIATES: NEGATIVE
POC OXYCODONE: NEGATIVE
POC PHENCYCLIDINE: NEGATIVE
POC PROPOXYPHENE: NEGATIVE
POC THC: NEGATIVE
POC TRICYCLIC ANTIDEPRESSANTS: NEGATIVE

## 2020-06-09 PROCEDURE — 99214 OFFICE O/P EST MOD 30 MIN: CPT | Performed by: NURSE PRACTITIONER

## 2020-06-09 PROCEDURE — 80305 DRUG TEST PRSMV DIR OPT OBS: CPT | Performed by: NURSE PRACTITIONER

## 2020-06-09 RX ORDER — ALENDRONATE SODIUM 70 MG/1
70 TABLET ORAL
COMMUNITY
Start: 2020-06-03

## 2020-06-09 NOTE — PROGRESS NOTES
CHIEF COMPLAINT:    Ms. Chavez has low back pain that started in April 2020.     Initial evaluation by VAISHALI Walton    Subjective   Regina Chavez is a 70 y.o. female.   She presents to the office for evaluation of back pain. She was referred here by VAISHALI Burden.  Office note from 4/8/2020 by VAISHALI Buredn reviewed.  The patient presented for a telephone visit complaining of increasing back pain.  The patient completed a T12 kyphoplasty on October 1, 2018 performed by Dr. Warren and was last seen by neurosurgery on October 18, 2018 for postoperative visit.  She reported that Celebrex relieved her back pain and stiffness.  At the time of this appointment she requested referral to physical therapy and pain management but due to the COVID-19 pandemic both outpatient therapy and procedures were suspended at that time.  Mrs. Calderon then saw VAISHALI Burden on 5/8/2020 after completing physical therapy with complaints of continued back pain. A lumbar MRI was completed on 5/5/2020 and was reviewed at this office visit.  Surgical interventions were not indicated at that time and she was re-referred to physical therapy.     Today her primary complaint is left pain over her sacroiliac region as well as left buttock pain. She denies any midline low- or mid-back pain. Her pain is currently a 0/10VAS in severity. She states that her pain is worsened when she is going up or down steps and will at times be a 2/10VAS in severity. She states that in the past her chiropractor has been able to do an adjustment to diminish her pain.  She states that her chiropractor has passed away and her new chiropractor has not been successful in her adjustments. She is scheduled to be evaluated by [facility]  tomorrow, she asked me what was different between [facility] and our office.  I explained that we are pain management and we provide procedures to help manage pain.  I explained that we do not offer any  surgical options for correction of the spine.  She states understanding.     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  She currently lives with her .  She reports occasional alcohol use, she denies any history of tobacco use, she denies any history of  Illicit drug use.  She reports family history of alcoholism/addiction in her parents.  She denies any family history of back or neck problems.     Past pain medications: None     Current pain medications: Celebrex      Past therapies:  Physical Therapy: Yes, completed PT in May 2020  Chiropractor: Yes, May 2020, not efficacious  Massage Therapy: No  TENS: No  Neck or back surgery: T12 Kyphoplasty 10/1/2018  Past pain management: None     Previous Injections: None    Back Pain   This is a chronic problem. The current episode started more than 1 month ago. The problem occurs intermittently. The problem is unchanged (since the start of the pain). The pain is present in the sacro-iliac and gluteal (left). The quality of the pain is described as cramping. The pain does not radiate. The pain is at a severity of 0/10. Exacerbated by: stair steps  Pertinent negatives include no numbness or weakness. Risk factors include menopause and sedentary lifestyle. She has tried NSAIDs, muscle relaxant, ice, heat and chiropractic manipulation (celebrex, PT) for the symptoms. The treatment provided mild relief.      PEG Assessment   What number best describes your pain on average in the past week?2  What number best describes how, during the past week, pain has interfered with your enjoyment of life?2  What number best describes how, during the past week, pain has interfered with your general activity?  2      Current Outpatient Medications:   •  amLODIPine (NORVASC) 5 MG tablet, Take 5 mg by mouth daily., Disp: , Rfl:   •  celecoxib (CeleBREX) 200 MG capsule, Take 200 mg by mouth Daily., Disp: , Rfl:   •  Cholecalciferol (VITAMIN D3) 125 MCG (5000 UT) capsule capsule, Take 5,000 Units by  "mouth Daily., Disp: , Rfl:   •  eszopiclone (LUNESTA) 2 MG tablet, TAKE 1 TABLET BY MOUTH AT BEDTIME, Disp: , Rfl:   •  ibandronate (BONIVA) 150 MG tablet, Take 150 mg by mouth., Disp: , Rfl:   •  Multiple Vitamins-Minerals (WOMENS 50+ MULTI VITAMIN/MIN PO), Take  by mouth., Disp: , Rfl:   •  alendronate (FOSAMAX) 70 MG tablet, Take 70 mg by mouth Every 7 (Seven) Days., Disp: , Rfl:     The following portions of the patient's history were reviewed and updated as appropriate: allergies, current medications, past family history, past medical history, past social history, past surgical history and problem list.      REVIEW OF PERTINENT MEDICAL DATA              Review of Systems   Constitutional: Negative for fatigue.   HENT: Negative for congestion.    Eyes: Negative for visual disturbance.   Respiratory: Negative for cough, shortness of breath and wheezing.    Cardiovascular: Negative.    Gastrointestinal: Negative for constipation and diarrhea.   Genitourinary: Negative for difficulty urinating.   Musculoskeletal: Positive for back pain.   Neurological: Negative for weakness and numbness.   Psychiatric/Behavioral: Positive for sleep disturbance. Negative for suicidal ideas. The patient is nervous/anxious.      Vitals:    06/09/20 1036   BP: 140/96   Pulse: 54   Resp: 18   Temp: 98 °F (36.7 °C)   SpO2: 99%   Weight: 67.7 kg (149 lb 3.2 oz)   Height: 154.9 cm (61\")   PainSc:   1   PainLoc: Back     Objective   Physical Exam   Constitutional: She is oriented to person, place, and time. Vital signs are normal. She appears well-developed and well-nourished.   HENT:   Head: Normocephalic and atraumatic.   Eyes: Conjunctivae, EOM and lids are normal.   Neck: Trachea normal and normal range of motion.   Cardiovascular: Normal rate.   Pulmonary/Chest: Effort normal.   Abdominal: Normal appearance.   Musculoskeletal:        Lumbar back: She exhibits decreased range of motion and tenderness.   Left SI compression  Left " Piriformis compression   Neurological: She is alert and oriented to person, place, and time. Gait normal.   Skin: Skin is warm, dry and intact.   Psychiatric: Her speech is normal. Judgment normal. Her affect is blunt. She is slowed.   Nursing note and vitals reviewed.      Assessment/Plan   [place] was seen today for back pain.    Diagnoses and all orders for this visit:    Sacroiliac joint dysfunction  -     Case Request  -     POC Urine Drug Screen, Triage  -     Urine Drug Screen Confirmation - Urine, Clean Catch; Future    Piriformis muscle pain  -     Case Request  -     POC Urine Drug Screen, Triage  -     Urine Drug Screen Confirmation - Urine, Clean Catch; Future    Other chronic pain  -     POC Urine Drug Screen, Triage  -     Urine Drug Screen Confirmation - Urine, Clean Catch; Future      --- I am recommending Left Sacroiliac joint injection and Left piriformis injection. Patient states that she will call the office back to schedule these.     Reviewed the procedure at length with the patient.  Included in the review was expectations, complications, risk and benefits.The procedure was described in detail and the risks, benefits and alternatives were discussed with the patient (including but not limited to: bleeding, infection, nerve damage, worsening of pain, inability to perform injection, paralysis, seizures, and death) who agreed to proceed.  Discussed the potential for sedation if warranted/wanted.  The procedure will plan to be performed at [facility] with fluoroscopic guidance(unless ultrasound is indicated) and could potentially have steroids and contrast dye used. Questions were answered and in a way the patient could understand.  Patient verbalized understanding and wishes to proceed.  This intervention will be ordered.  Discussed with patient that all procedures are part of a multimodal plan of care and include either formal PT or a home exercise program.  Patient has no  evidence of coagulopathy or current infection.    --- Follow-up after procedure     MEGAN REPORT    As part of the patient's treatment plan, I am prescribing controlled substances. The patient has been made aware of appropriate use of such medications, including potential risk of somnolence, limited ability to drive and/or work safely, and the potential for dependence or overdose. It has also bee made clear that these medications are for use by this patient only, without concomitant use of alcohol or other substances unless prescribed.     Patient has completed prescribing agreement detailing terms of continued prescribing of controlled substances, including monitoring MEGAN reports, urine drug screening, and pill counts if necessary. The patient is aware that inappropriate use will results in cessation of prescribing such medications.    MEGAN report has been reviewed and scanned into the patient's chart.    As the clinician, I personally reviewed the MEGAN from 6/8/2020 while the patient was in the office today.    History and physical exam exhibit continued safe and appropriate use of controlled substances.     EMR Dragon/Transcription disclaimer:   Much of this encounter note is an electronic transcription/translation of spoken language to printed text. The electronic translation of spoken language may permit erroneous, or at times, nonsensical words or phrases to be inadvertently transcribed; Although I have reviewed the note for such errors, some may still exist.

## 2020-06-14 ENCOUNTER — RESULTS ENCOUNTER (OUTPATIENT)
Dept: PAIN MEDICINE | Facility: CLINIC | Age: 70
End: 2020-06-14

## 2020-06-14 DIAGNOSIS — G89.29 OTHER CHRONIC PAIN: ICD-10-CM

## 2020-06-14 DIAGNOSIS — M79.18 PIRIFORMIS MUSCLE PAIN: ICD-10-CM

## 2020-06-14 DIAGNOSIS — M53.3 SACROILIAC JOINT DYSFUNCTION: ICD-10-CM

## 2020-09-04 NOTE — TELEPHONE ENCOUNTER
Pt number is actually [phone]. She called back to ask if we had found out what doctor was recommended to her.   76 yo male with progressive GBM now with fever x 2 days likely related to pneumonia and /or enterobacter UTI.  He has been debulked 3 times, is s/p recent admission to [facility] for speech difficulties and left sided weakness, treated with increased decadron, now tapered.  He is an aspiration risk and requires ISC.  He is DNR/DNI and is being considered for directed GBM treatment but may not be a candidate.  Suspect bacterial infection but if he does not respond will need to consider fungal infections as well-PCP, aspergillus.  Suggest:  1.vanco and cefepime for now-enterobacter  coverage and pneumonia coverage  2.MRSA nasal screen, if negative can stop vanco  3.Await sensitivities of enterobacter  4.Fungitell, if normal it will make PCP unlikely, a positive  result will be nonspecific .  5.goals of care discussions have been ongoing as per notes, infection in this context often goes along with progression of underlying disease.

## 2020-09-11 ENCOUNTER — LAB REQUISITION (OUTPATIENT)
Dept: LAB | Facility: HOSPITAL | Age: 70
End: 2020-09-11

## 2020-09-11 DIAGNOSIS — Z00.00 ENCOUNTER FOR GENERAL ADULT MEDICAL EXAMINATION WITHOUT ABNORMAL FINDINGS: ICD-10-CM

## 2020-09-24 ENCOUNTER — TELEPHONE (OUTPATIENT)
Dept: NEUROSURGERY | Facility: CLINIC | Age: 70
End: 2020-09-24

## 2020-09-24 NOTE — TELEPHONE ENCOUNTER
Patient called and is having low back pain.  The patient says its arthritis and wanted to know if we can treat for that.

## 2021-03-09 DIAGNOSIS — Z23 IMMUNIZATION DUE: ICD-10-CM

## 2021-04-06 ENCOUNTER — APPOINTMENT (OUTPATIENT)
Dept: GENERAL RADIOLOGY | Facility: HOSPITAL | Age: 71
End: 2021-04-06

## 2021-04-06 ENCOUNTER — APPOINTMENT (OUTPATIENT)
Dept: CT IMAGING | Facility: HOSPITAL | Age: 71
End: 2021-04-06

## 2021-04-06 ENCOUNTER — HOSPITAL ENCOUNTER (EMERGENCY)
Facility: HOSPITAL | Age: 71
Discharge: HOME | End: 2021-04-06
Attending: EMERGENCY MEDICINE | Admitting: EMERGENCY MEDICINE

## 2021-04-06 VITALS
WEIGHT: 152 LBS | HEIGHT: 61 IN | DIASTOLIC BLOOD PRESSURE: 66 MMHG | OXYGEN SATURATION: 96 % | BODY MASS INDEX: 28.7 KG/M2 | HEART RATE: 75 BPM | RESPIRATION RATE: 18 BRPM | TEMPERATURE: 98.5 F | SYSTOLIC BLOOD PRESSURE: 125 MMHG

## 2021-04-06 DIAGNOSIS — S00.03XA CONTUSION OF SCALP, INITIAL ENCOUNTER: ICD-10-CM

## 2021-04-06 DIAGNOSIS — M25.551 ACUTE RIGHT HIP PAIN: Primary | ICD-10-CM

## 2021-04-06 DIAGNOSIS — N28.9 RENAL LESION: ICD-10-CM

## 2021-04-06 PROCEDURE — 70450 CT HEAD/BRAIN W/O DYE: CPT

## 2021-04-06 PROCEDURE — 73560 X-RAY EXAM OF KNEE 1 OR 2: CPT

## 2021-04-06 PROCEDURE — 25010000002 HYDROMORPHONE PER 4 MG: Performed by: EMERGENCY MEDICINE

## 2021-04-06 PROCEDURE — 73502 X-RAY EXAM HIP UNI 2-3 VIEWS: CPT

## 2021-04-06 PROCEDURE — 99283 EMERGENCY DEPT VISIT LOW MDM: CPT

## 2021-04-06 PROCEDURE — 72125 CT NECK SPINE W/O DYE: CPT

## 2021-04-06 PROCEDURE — 74176 CT ABD & PELVIS W/O CONTRAST: CPT

## 2021-04-06 PROCEDURE — 96372 THER/PROPH/DIAG INJ SC/IM: CPT

## 2021-04-06 RX ORDER — HYDROCODONE BITARTRATE AND ACETAMINOPHEN 5; 325 MG/1; MG/1
1 TABLET ORAL EVERY 6 HOURS PRN
Qty: 8 TABLET | Refills: 0 | Status: SHIPPED | OUTPATIENT
Start: 2021-04-06

## 2021-04-06 RX ORDER — HYDROCODONE BITARTRATE AND ACETAMINOPHEN 5; 325 MG/1; MG/1
1 TABLET ORAL ONCE
Status: COMPLETED | OUTPATIENT
Start: 2021-04-06 | End: 2021-04-06

## 2021-04-06 RX ORDER — CYCLOBENZAPRINE HCL 10 MG
10 TABLET ORAL 3 TIMES DAILY PRN
Qty: 30 TABLET | Refills: 0 | Status: SHIPPED | OUTPATIENT
Start: 2021-04-06

## 2021-04-06 RX ORDER — HYDROMORPHONE HYDROCHLORIDE 1 MG/ML
0.5 INJECTION, SOLUTION INTRAMUSCULAR; INTRAVENOUS; SUBCUTANEOUS ONCE
Status: DISCONTINUED | OUTPATIENT
Start: 2021-04-06 | End: 2021-04-06

## 2021-04-06 RX ORDER — HYDROMORPHONE HYDROCHLORIDE 1 MG/ML
0.5 INJECTION, SOLUTION INTRAMUSCULAR; INTRAVENOUS; SUBCUTANEOUS ONCE
Status: COMPLETED | OUTPATIENT
Start: 2021-04-06 | End: 2021-04-06

## 2021-04-06 RX ORDER — CYCLOBENZAPRINE HCL 10 MG
10 TABLET ORAL ONCE
Status: COMPLETED | OUTPATIENT
Start: 2021-04-06 | End: 2021-04-06

## 2021-04-06 RX ORDER — ACETAMINOPHEN 500 MG
1000 TABLET ORAL ONCE
Status: COMPLETED | OUTPATIENT
Start: 2021-04-06 | End: 2021-04-06

## 2021-04-06 RX ADMIN — HYDROMORPHONE HYDROCHLORIDE 0.5 MG: 1 INJECTION, SOLUTION INTRAMUSCULAR; INTRAVENOUS; SUBCUTANEOUS at 18:46

## 2021-04-06 RX ADMIN — ACETAMINOPHEN 1000 MG: 500 TABLET, FILM COATED ORAL at 20:19

## 2021-04-06 RX ADMIN — HYDROMORPHONE HYDROCHLORIDE 0.5 MG: 1 INJECTION, SOLUTION INTRAMUSCULAR; INTRAVENOUS; SUBCUTANEOUS at 20:22

## 2021-04-06 RX ADMIN — HYDROCODONE BITARTRATE AND ACETAMINOPHEN 1 TABLET: 5; 325 TABLET ORAL at 23:37

## 2021-04-06 RX ADMIN — CYCLOBENZAPRINE 10 MG: 10 TABLET, FILM COATED ORAL at 20:19

## 2021-04-06 NOTE — ED NOTES
Pt to ER from home c/o trip and fall about 1.5 hour ago, right hip and knee pain. No LOC, no blood thinners.    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     Catalino Blanco RN  04/06/21 1986

## 2021-04-06 NOTE — ED PROVIDER NOTES
EMERGENCY DEPARTMENT ENCOUNTER    Room Number:  22/22  Date seen:  4/6/2021  PCP: John Be MD  Historian: Patient      HPI:  Chief Complaint: Fall, right hip pain  A complete HPI/ROS/PMH/PSH/SH/FH are unobtainable due to: Nothing  Context: Regina Chavez is a 70 y.o. female who presents to the ED c/o fall today while at [facility].  She reports that she fell onto her right side and also hit her head.  She takes no blood thinners.  She reports no loss of consciousness.  She is complaining of pain primarily over the right hip and right buttock.  She reports that it is worse with any movement.  It is constant, currently 5 out of 10 severity.  She denies nausea or vomiting.  She denies any radiation of the pain down the right lower extremity.  She has had no bowel or bladder incontinence.    Review of her chart reveals history of compression fracture of T12 vertebrae.            PAST MEDICAL HISTORY  Active Ambulatory Problems     Diagnosis Date Noted   • Osteopenia    • Hypertension    • Rupture of implant of left breast 12/14/2017   • Fall 09/26/2018   • Osteopenia of multiple sites 09/26/2018   • Closed fracture of twelfth thoracic vertebra (CMS/[facility]) 09/26/2018   • Alcohol dependency (CMS/[facility]) 09/26/2018   • Fracture of thoracic spine (CMS/[facility]) 09/26/2018   • Closed wedge compression fracture of T12 vertebra (CMS/[facility]) 09/26/2018   • Depression 10/01/2018   • Insomnia 10/01/2018   • Chronic midline low back pain without sciatica 04/08/2020   • Sacroiliac joint dysfunction 06/09/2020   • Piriformis muscle pain 06/09/2020   • Other chronic pain 06/09/2020     Resolved Ambulatory Problems     Diagnosis Date Noted   • Hypokalemia 09/26/2018   • UTI (urinary tract infection) 09/27/2018     Past Medical History:   Diagnosis Date   • Anxiety    • Arthritis of knee    • Heme + stool    • History of alcohol abuse    • History of drug abuse (CMS/[facility])    • RLQ abdominal pain          PAST SURGICAL HISTORY  Past  Surgical History:   Procedure Laterality Date   • APPENDECTOMY     • BREAST IMPLANT SURGERY     •  SECTION     • CHOLECYSTECTOMY     • KNEE SURGERY     • KYPHOPLASTY Bilateral 10/1/2018    Procedure: KYPHOPLASTY WITH POSSIBLE BIOPSY, THORACIC 12;  Surgeon: Orlin Warren IV, MD;  Location: [facility] ;  Service: Neurosurgery         FAMILY HISTORY  Family History   Problem Relation Age of Onset   • Hip fracture Mother    • Hypertension Mother    • Heart failure Mother    • Osteoporosis Mother    • Heart failure Father    • Hypertension Father          SOCIAL HISTORY  Social History     Socioeconomic History   • Marital status:      Spouse name: Not on file   • Number of children: 2   • Years of education: Bachelor Degree   • Highest education level: Not on file   Tobacco Use   • Smoking status: Never Smoker   • Smokeless tobacco: Never Used   Substance and Sexual Activity   • Alcohol use: Yes     Comment: nightly   • Drug use: Yes     Types: Other     Comment: ambien   • Sexual activity: Yes     Partners: Male         ALLERGIES  Patient has no known allergies.        REVIEW OF SYSTEMS  Review of Systems   Review of all 14 systems is negative other than stated in the HPI above.      PHYSICAL EXAM  ED Triage Vitals   Temp Heart Rate Resp BP SpO2   21 1738 21 1738 21 1738 21 1844 21 1738   98.5 °F (36.9 °C) 68 16 121/81 99 %      Temp src Heart Rate Source Patient Position BP Location FiO2 (%)   -- -- -- -- --                GENERAL: Awake alert, no acute distress  HENT: nares patent, no scalp hematoma, superficial abrasion right forehead  EYES: no scleral icterus, pupils 3 mm reactive bilaterally  CV: regular rhythm, normal rate  RESPIRATORY: normal effort  ABDOMEN: soft, nondistended, nontender throughout  MUSCULOSKELETAL: no deformity.  There is point tenderness over the right hip anteriorly as well as the right buttock and the right greater  trochanter.  NEURO: alert, moves all extremities, follows commands  PSYCH:  calm, cooperative  SKIN: warm, dry    Vital signs and nursing notes reviewed.          LAB RESULTS  No results found for this or any previous visit (from the past 24 hour(s)).    Ordered the above labs and reviewed the results.        RADIOLOGY  CT Abdomen Pelvis Without Contrast    Result Date: 4/6/2021  CT OF THE ABDOMEN AND PELVIS WITHOUT CONTRAST  HISTORY: Right low back pain and right buttock pain after a fall  COMPARISON: None available.  TECHNIQUE: Axial CT imaging was obtained through the abdomen and pelvis. No IV contrast was administered.  FINDINGS: Images through the lung bases do not demonstrate any acute abnormalities. The stomach and duodenum are unremarkable, as are the adrenal glands. Gallbladder surgically absent. No suspicious hepatic lesions are seen. There are changes of prior vertebroplasty at T12. No acute fractures are seen. The adrenal glands, stomach, duodenum, and pancreas all appear normal. There is no hydronephrosis seen. There is a low-attenuation lesion arising from the right kidney which measures higher in density than a simple cyst. It measures approximately 1 cm in size. Further assessment with renal protocol CT or MRI on a nonemergent basis is recommended. The patient has a 4 mm nonobstructing stone within the right kidney. No distal ureteral or bladder stones are seen. Urinary bladder appears normal. Uterus is normal. There is no bowel obstruction. There is some colonic diverticulosis. There is no diverticulitis. Appendix is absent per history. No pelvic hematoma is seen. No flank hematoma is identified. There is a small fat-containing umbilical hernia. Degenerative changes of the lumbar spine are seen, most significant at L4-L5.       1. No acute traumatic injury identified. 2. Indeterminate right renal lesion. Further assessment with renal protocol CT or MRI on a nonemergent basis is recommended.   Radiation dose reduction techniques were utilized, including automated exposure control and exposure modulation based on body size.  This report was finalized on 4/6/2021 9:58 PM by Dr. Kristine Zuñiga M.D.      CT Head Without Contrast, CT Cervical Spine Without Contrast    Result Date: 4/6/2021  CT HEAD WO CONTRAST-, CT CERVICAL SPINE WO CONTRAST-  INDICATIONS: Trauma  TECHNIQUE: Radiation dose reduction techniques were utilized, including automated exposure control and exposure modulation based on body size. Noncontrast head CT, cervical spine CT  COMPARISON: 09/26/2018  FINDINGS:  Head CT:  No acute intracranial hemorrhage, midline shift or mass effect. No acute territorial infarct is identified.  Mild periventricular hypodensities suggest chronic small vessel ischemic change in a patient this age.    Ventricles, cisterns, cerebral sulci are unremarkable for patient age.  The visualized paranasal sinuses, orbits, mastoid air cells are unremarkable.   Cervical spine CT:   No acute fracture or malalignment is noted.  Uncovertebral joint hypertrophy appears to contribute to mild bilateral neuroforaminal narrowing at C6/7.         No acute intracranial hemorrhage or hydrocephalus. No acute cervical fracture identified. If there is further clinical concern, MRI could be considered for further evaluation.  This report was finalized on 4/6/2021 7:26 PM by Dr. Fei Mims M.D.      XR Knee 1 or 2 View Right, XR Hip With or Without Pelvis 2 - 3 View Right    Result Date: 4/6/2021  XR KNEE 1 OR 2 VW RIGHT-, XR HIP W OR WO PELVIS 2-3 VIEW RIGHT-  Clinical: Right hip pain, knee pain  Right knee findings: Trace suprapatellar effusion. There is patellar spur formation with narrowing of the patellofemoral articulation. There is narrowing of the medial and lateral compartments. There is periarticular bone hypertrophy along the lateral compartment.  CONCLUSION: No acute osseous nor articular abnormality, degenerative  change as described above  AP pelvis right hip findings: There is narrowing of the right hip joint. No avascular necrosis, fracture or dislocation seen. The right hemipelvis is satisfactory in appearance. There is narrowing of the left hip joint noted incidentally. It addition there appears to be a benign bone island within the left intertrochanteric area.  CONCLUSION: Right hip joint narrowing.  This report was finalized on 4/6/2021 7:34 PM by Dr. José Miguel Adhikari M.D.        Ordered the above noted radiological studies. Reviewed by me in PACS.            PROCEDURES  Procedures              MEDICATIONS GIVEN IN ER  Medications   HYDROmorphone (DILAUDID) injection 0.5 mg (0.5 mg Intramuscular Given 4/6/21 1846)   acetaminophen (TYLENOL) tablet 1,000 mg (1,000 mg Oral Given 4/6/21 2019)   cyclobenzaprine (FLEXERIL) tablet 10 mg (10 mg Oral Given 4/6/21 2019)   HYDROmorphone (DILAUDID) injection 0.5 mg (0.5 mg Intramuscular Given 4/6/21 2022)   HYDROcodone-acetaminophen (NORCO) 5-325 MG per tablet 1 tablet (1 tablet Oral Given 4/6/21 2337)                   MEDICAL DECISION MAKING, PROGRESS, and CONSULTS    All labs have been independently reviewed by me.  All radiology studies have been reviewed by me and discussed with radiologist dictating the report.   EKG's independently viewed and interpreted by me.  Discussion below represents my analysis of pertinent findings related to patient's condition, differential diagnosis, treatment plan and final disposition.    ED Course as of Apr 06 2340   Tue Apr 06, 2021   1924 I independently interpreted the images of the knee, AP pelvis, CT head, CT cervical spine in PACS.  I see no evidence of acute fracture or malalignment.  There is degenerative arthritis of the knee.  There is no evidence of intracranial hemorrhage and no apparent malalignment of the cervical spine.    [JR]   1950 Patient remains in severe pain despite hydromorphone IV.  I explained that her initial imaging  studies appeared reassuring, however I plan to proceed with CT of the abdomen pelvis to evaluate for possible acute spine pathology or occult pelvis fracture given her persistent pain.    [JR]   2327 Patient was able to ambulate with a walker.  I explained plan to discharge home with Flexeril, Tylenol, or hydrocodone as needed for pain.  She was given hydrocodone p.o. prior to discharge.  I reiterated to her the need to follow-up with her PCP for further evaluation of the right renal lesion.    [JR]      ED Course User Index  [JR] Shaquille Moncada MD              I wore a mask, face shield, and gloves during this patient encounter.  Patient also wearing a surgical mask.  Hand hygeine performed before and after seeing the patient.    DIAGNOSIS  Final diagnoses:   Acute right hip pain   Contusion of scalp, initial encounter   Renal lesion         DISPOSITION  DISCHARGE    Patient discharged in stable condition.    Reviewed implications of results, diagnosis, meds, responsibility to follow up, warning signs and symptoms of possible worsening, potential complications and reasons to return to ER.    Patient/Family voiced understanding of above instructions.    Discussed plan for discharge, as there is no emergent indication for admission. Patient referred to primary care provider for BP management due to today's BP. Pt/family is agreeable and understands need for follow up and repeat testing.  Pt is aware that discharge does not mean that nothing is wrong but it indicates no emergency is present that requires admission and they must continue care with follow-up as given below or physician of their choice.     FOLLOW-UP  John Be MD  [facility] [address]    Schedule an appointment as soon as possible for a visit            Medication List      New Prescriptions    cyclobenzaprine 10 MG tablet  Commonly known as: FLEXERIL  Take 1 tablet by mouth 3 (Three) Times a Day As Needed for Muscle  Spasms.     HYDROcodone-acetaminophen 5-325 MG per tablet  Commonly known as: NORCO  Take 1 tablet by mouth Every 6 (Six) Hours As Needed for Moderate Pain .           Where to Get Your Medications      These medications were sent to [facility] [address] [place] [id number] [facility] - [phone]  - [phone]   [address] [facility] [address]    Phone: [phone]   · cyclobenzaprine 10 MG tablet  · HYDROcodone-acetaminophen 5-325 MG per tablet                   Latest Documented Vital Signs:  As of 23:40 EDT  BP- 125/66 HR- 75 Temp- 98.5 °F (36.9 °C) O2 sat- 96%        --    Please note that portions of this were completed with a voice recognition program.          Shaquille Moncada MD  04/06/21 1144

## 2021-04-06 NOTE — ED NOTES
Pt was walking from [facility] [employer] when she fell down 1step. Pt landed on right side. Pt c/o right lower back and right knee. Onset around 1745. Pt has abrasion to right forehead. No blood thinners. Pt reports pain with weight bearing.     Pt had mask on when placed in room. RN wore goggles and surgical mask upon entering room.        Essence Mckinney, RN  04/06/21 5042

## 2021-04-07 NOTE — DISCHARGE INSTRUCTIONS
You have a lesion on your right kidney that needs further evaluation as outpatient.  See your PCP.

## 2021-04-08 ENCOUNTER — TELEPHONE (OUTPATIENT)
Dept: NEUROSURGERY | Facility: CLINIC | Age: 71
End: 2021-04-08

## 2021-04-08 NOTE — TELEPHONE ENCOUNTER
Caller: Regina Chavez    Relationship to patient: Self    Best call back number: [phone]    Chief complaint: LOW BACK PAIN    Type of visit: FOLLOW UP    Requested date: ASAP     If rescheduling, when is the original appointment:      Additional notes:PT IS CALLING BACK AGAIN TO SEE IF SHE CAN BE SEEN ASAP. PT WENT TO ER ON 04/06/21.   PLEASE ADVISE  THANK YOU

## 2021-04-08 NOTE — TELEPHONE ENCOUNTER
Called and spoke with patient in regards to setting up an appointment with a APC but our schedules are full so she would have to go on a wait list for an appointment. I advised patient that if she is having any severe pain that she cannot wait for an appointment, to go to ER. Patient voiced understanding and wanted to be put on a wait list.

## 2021-04-08 NOTE — TELEPHONE ENCOUNTER
Provider: BIRD  Caller: PATIENT  Reason for Call: PATIENT/CAREGIVER CALLED REQUESTING MEDICAL ADVICE/APPOINTMENT DUE TO RECENT ONSET OF SYMPTOMS OCCURRING FOR THE PAST 2 DAYS. SYMPTOMS ARE DESCRIBED AS ARCHING PAIN IN BACK MAKING IT HARD TO WALK AFTER FALLING IN BUTT ON 4/6/21. PATIENT INDICATES THAT SHE MUST USE A WALKER TO MOVE. PATIENT PRESENTED TO [facility] [place] [facility] THAT SAME DAY. PATIENT WAS PRESCRIBED MEDS BUT ARE NOT IMPROVING SYMPTOMS. THERE IS CURRENT IMAGING IN CHART AT TIME OF CALL. PER LAST OFFICE NOTE, PATIENT CONSIDERED PRN & REFERRED TO PHYSICAL THERAPY. Office Visit with Maria Del Carmen Rae APRN (04/08/2020) PLEASE ADVISE HOW PROVIDER WOULD LIKE TO PROCEED?    When was the patient last seen: 04/08/20  When did it start: 2 DAYS AGO  Where is it located: LOWER BACK  Characteristics of symptom/severity: ARCHY PAIN  Timing- Is it constant or intermittent: CONSTANT  What makes it worse: WALKING  What makes it better: N/A  What therapies/medications have you tried: HYDROCODONE, FLEXERIL    PATIENT CAN BE CONTACTED [phone] WITH FURTHER INSTRUCTIONS

## 2022-02-21 ENCOUNTER — TRANSCRIBE ORDERS (OUTPATIENT)
Dept: SLEEP MEDICINE | Facility: HOSPITAL | Age: 72
End: 2022-02-21

## 2022-02-21 DIAGNOSIS — G25.81 RESTLESS LEGS SYNDROME (RLS): ICD-10-CM

## 2022-02-21 DIAGNOSIS — F51.04 PSYCHOPHYSIOLOGICAL INSOMNIA: Primary | ICD-10-CM

## 2022-03-04 ENCOUNTER — APPOINTMENT (OUTPATIENT)
Dept: SLEEP MEDICINE | Facility: HOSPITAL | Age: 72
End: 2022-03-04

## 2022-04-22 ENCOUNTER — TELEPHONE (OUTPATIENT)
Dept: SLEEP MEDICINE | Facility: HOSPITAL | Age: 72
End: 2022-04-22

## 2022-04-22 NOTE — TELEPHONE ENCOUNTER
"Tech called home #, pt answered with \"what do you want\" tech informed was calling to confirm appt for sleep center. Tech reviewed information with patient and she confirmed. MAB  "

## 2022-04-23 ENCOUNTER — HOSPITAL ENCOUNTER (OUTPATIENT)
Dept: SLEEP MEDICINE | Facility: HOSPITAL | Age: 72
Discharge: HOME | End: 2022-04-23
Admitting: INTERNAL MEDICINE

## 2022-04-23 DIAGNOSIS — G25.81 RESTLESS LEGS SYNDROME (RLS): ICD-10-CM

## 2022-04-23 DIAGNOSIS — F51.04 PSYCHOPHYSIOLOGICAL INSOMNIA: ICD-10-CM

## 2022-04-23 PROCEDURE — 95810 POLYSOM 6/> YRS 4/> PARAM: CPT

## 2022-04-25 DIAGNOSIS — G47.33 OSA (OBSTRUCTIVE SLEEP APNEA): Primary | ICD-10-CM

## 2022-04-28 ENCOUNTER — TELEPHONE (OUTPATIENT)
Dept: SLEEP MEDICINE | Facility: HOSPITAL | Age: 72
End: 2022-04-28

## 2023-05-04 ENCOUNTER — PREP FOR SURGERY (OUTPATIENT)
Dept: OTHER | Facility: HOSPITAL | Age: 73
End: 2023-05-04
Payer: MEDICARE

## 2023-05-04 ENCOUNTER — OFFICE VISIT (OUTPATIENT)
Dept: ORTHOPEDIC SURGERY | Facility: CLINIC | Age: 73
End: 2023-05-04
Payer: MEDICARE

## 2023-05-04 VITALS — HEIGHT: 61 IN | TEMPERATURE: 98.6 F | BODY MASS INDEX: 29.64 KG/M2 | WEIGHT: 157 LBS

## 2023-05-04 DIAGNOSIS — M17.11 PRIMARY OSTEOARTHRITIS OF RIGHT KNEE: Primary | ICD-10-CM

## 2023-05-04 DIAGNOSIS — M25.561 RIGHT KNEE PAIN, UNSPECIFIED CHRONICITY: Primary | ICD-10-CM

## 2023-05-04 RX ORDER — VANCOMYCIN HYDROCHLORIDE 1 G/200ML
15 INJECTION, SOLUTION INTRAVENOUS ONCE
OUTPATIENT
Start: 2023-05-04 | End: 2023-05-04

## 2023-05-04 RX ORDER — CHLORHEXIDINE GLUCONATE 500 MG/1
CLOTH TOPICAL 2 TIMES DAILY
OUTPATIENT
Start: 2023-05-04

## 2023-05-04 RX ORDER — PREGABALIN 75 MG/1
150 CAPSULE ORAL ONCE
OUTPATIENT
Start: 2023-05-04 | End: 2023-05-04

## 2023-05-04 RX ORDER — CEFAZOLIN SODIUM 2 G/100ML
2 INJECTION, SOLUTION INTRAVENOUS ONCE
OUTPATIENT
Start: 2023-05-04 | End: 2023-05-04

## 2023-05-04 RX ORDER — MELOXICAM 15 MG/1
15 TABLET ORAL ONCE
OUTPATIENT
Start: 2023-05-04 | End: 2023-05-04

## 2023-05-04 NOTE — PROGRESS NOTES
Patient: Regina Chavez  YOB: 1950 73 y.o. female  Medical Record Number: [id number]    Chief Complaints:   Chief Complaint   Patient presents with   • Right Knee - Initial Evaluation       History of Present Illness:Regina Chavez is a 73 y.o. female who presents as a new patient both myself as well as to the practice since its been well over 3 years since she was seen last.  Patient reports severe right knee pain.  It is increasing in nature.  Years ago she was told she needed a knee replacement patient reports the pain is so bad now she would like to proceed.  She has tried and failed conservative measures including injections, meloxicam, physical therapy, patient would like to proceed with total knee replacement    Allergies: No Known Allergies    Medications:   Current Outpatient Medications   Medication Sig Dispense Refill   • alendronate (FOSAMAX) 70 MG tablet Take 1 tablet by mouth Every 7 (Seven) Days.     • amLODIPine (NORVASC) 5 MG tablet Take 1 tablet by mouth Daily.     • benzonatate (TESSALON) 100 MG capsule      • budesonide (PULMICORT) 180 MCG/ACT inhaler Inhale 1 puff.     • celecoxib (CeleBREX) 200 MG capsule Take 1 capsule by mouth Daily.     • Cholecalciferol (VITAMIN D3) 125 MCG (5000 UT) capsule capsule Take 1 capsule by mouth Daily.     • cyclobenzaprine (FLEXERIL) 10 MG tablet Take 1 tablet by mouth 3 (Three) Times a Day As Needed for Muscle Spasms. 30 tablet 0   • Dexilant 60 MG capsule      • dexlansoprazole (DEXILANT) 60 MG capsule Take 1 capsule by mouth Daily.     • eszopiclone (LUNESTA) 2 MG tablet TAKE 1 TABLET BY MOUTH AT BEDTIME     • eszopiclone (LUNESTA) 3 MG tablet Take 1 tablet by mouth Every Night.     • famotidine (PEPCID) 40 MG tablet Take 1 tablet by mouth Daily.     • HYDROcodone-acetaminophen (NORCO) 5-325 MG per tablet Take 1 tablet by mouth Every 6 (Six) Hours As Needed for Moderate Pain . 8 tablet 0   • Liraglutide (Saxenda) 18 MG/3ML injection pen  "Inject 3 mg under the skin into the appropriate area as directed Daily.     • lisinopril-hydrochlorothiazide (PRINZIDE,ZESTORETIC) 10-12.5 MG per tablet Take 1 tablet by mouth Daily.     • Multiple Vitamins-Minerals (WOMENS 50+ MULTI VITAMIN/MIN PO) Take  by mouth.     • pantoprazole (PROTONIX) 40 MG EC tablet Take 1 tablet by mouth.     • predniSONE (DELTASONE) 20 MG tablet TAKE 2 TABLETS BY MOUTH ONCE DAILY FOR 5 DAYS     • Pumpkin Seed-Soy Germ (AZO BLADDER CONTROL/GO-LESS PO) Take  by mouth.     • raloxifene (EVISTA) 60 MG tablet Take 1 tablet by mouth Daily.     • rOPINIRole (REQUIP) 0.5 MG tablet TAKE 1 TABLET BY MOUTH EVERY DAY 2 HOURS BEFORE BEDTIME     • Fluticasone Propionate, Inhal, (Flovent Diskus) 250 MCG/BLIST aerosol powder  Inhale 1 Act.     • raloxifene (EVISTA) 60 MG tablet Take 60 mg by mouth Daily.       No current facility-administered medications for this visit.         The following portions of the patient's history were reviewed and updated as appropriate: allergies, current medications, past family history, past medical history, past social history, past surgical history and problem list.    Review of Systems:   14 point review of systems was performed. All systems negative except pertinent positives/negatives listed in HPI above    Physical Exam:   Vitals:    05/04/23 0847   Temp: 98.6 °F (37 °C)   TempSrc: Temporal   Weight: 71.2 kg (157 lb)   Height: 154.9 cm (60.98\")   PainSc:   8   PainLoc: Knee       General: A and O x 3, ASA, NAD    Skin clear no unusual lesions noted  Right knee patient does have trace amount of effusion noted with 120 degrees flexion neutral in extension with a positive Kevon negative Lockman calf soft and nontender      Radiology:  Xrays 3views (ap,lateral, sunrise) right knee were ordered and reviewed today secondary to increasing pain show bone-on-bone end-stage osteoarthritis with cyst and spur formation.  Comparative views show definite progression in " arthritic changes    Assessment/Plan: End-stage osteoarthritis right knee with increasing pain    Patient and I discussed options, she would like to proceed with right total knee replacement same day home health.  Continuation of conservative management vs. TKA discussed.  The patient wishes to proceed with total knee replacement.  At this point the patient has failed the full compliment of conservative treatment and stating complete understanding of the risks/benefits/ anternatives wishes to proceed with surgical treatment.    Risk and benefits of surgery were reviewed.  Including, but not limited to, blood clots or pulmonary embolism, anesthesia risk, infection, fracture, skin/leg numbness, persistent pain/crepitance/popping/catching, failure of the implant, need for future surgeries, hematoma, possible nerve or blood vessel injury, need for transfusion, and potential risk of stroke,heart attack or death, among others.  The patient understands and wishes to proceed.     It was explained that if tissue has been repaired or reconstructed, there is also an increased chance of failure which may require further management.  Following the completion of the discussion, the patient expressed understanding of this planned course of care, all their questions were answered and consent will be obtained preoperatively.    Operative Plan: [facility] Total Knee Replacement performing the procedure on an outpatient basis with home health rehab        Claritza Smith, APRN  5/4/2023

## 2023-05-05 ENCOUNTER — PATIENT ROUNDING (BHMG ONLY) (OUTPATIENT)
Dept: ORTHOPEDIC SURGERY | Facility: CLINIC | Age: 73
End: 2023-05-05
Payer: MEDICARE

## 2023-05-05 NOTE — PROGRESS NOTES
A [employer] Message has been sent to the patient [place] [facility] [place]

## 2023-05-15 ENCOUNTER — TELEPHONE (OUTPATIENT)
Dept: ORTHOPEDIC SURGERY | Facility: CLINIC | Age: 73
End: 2023-05-15

## 2023-05-15 NOTE — TELEPHONE ENCOUNTER
Caller: Regina Chavez    Relationship to patient: Self    Best call back number:[phone]    Patient is needing:WANTED TO TELL DR OTT SHE HAS OSTEOPOROSIS

## 2023-05-17 ENCOUNTER — TELEPHONE (OUTPATIENT)
Dept: ORTHOPEDIC SURGERY | Facility: CLINIC | Age: 73
End: 2023-05-17

## 2023-05-17 ENCOUNTER — TELEPHONE (OUTPATIENT)
Dept: ORTHOPEDIC SURGERY | Facility: CLINIC | Age: 73
End: 2023-05-17
Payer: MEDICARE

## 2023-05-17 NOTE — TELEPHONE ENCOUNTER
Caller: LETICIA    Relationship: SELF    Best call back number:   [phone]      What was the call regarding: PATIENT WANTS TO KNOW IF SHE HAS TOLD DR. OTT ABOUT HER HAVING osteoporosis IN HER HER KNEES AND IF ITLL AFFECT HER SX    Do you require a callback: YES

## 2023-05-17 NOTE — TELEPHONE ENCOUNTER
Caller: Regina Chavez    Relationship: Self    Best call back number:     What was the call regarding: THE PATIENT WOULD LIKE TO KNOW HOW MANY PT APPTS SHE HAS TO HAVE BEFORE SHE CAN SCHEDULE SX    Do you require a callback: YES

## 2023-05-18 ENCOUNTER — TELEPHONE (OUTPATIENT)
Dept: ORTHOPEDIC SURGERY | Facility: CLINIC | Age: 73
End: 2023-05-18
Payer: MEDICARE

## 2023-05-18 NOTE — TELEPHONE ENCOUNTER
Please let patient know just 1-2 visits on prehab PT, also Dior, has patient been scheduled yet for surgery?  Case request placed at last visit thank you

## 2023-05-18 NOTE — TELEPHONE ENCOUNTER
Spoke with patient relayed she will only need 1-2 visits of physical therapy. Also let patient know Dr Shahid surgical scheduler, Dior has tried to reach her to set up surgery dates. Patient verbalized understanding

## 2023-06-05 ENCOUNTER — TELEPHONE (OUTPATIENT)
Dept: ORTHOPEDIC SURGERY | Facility: CLINIC | Age: 73
End: 2023-06-05
Payer: MEDICARE

## 2023-06-05 NOTE — TELEPHONE ENCOUNTER
Caller: SANDRA VILLAFANA    Relationship to patient: SELF    Best call back number: [phone]    Patient is needing: PATIENT HAS SOME SPECIFIC QUESTIONS REGARDING HOW LONG SHE HAS TO WAIT TO BE SCHEDULED A POTENTIAL RIGHT KNEE SURGERY.

## 2023-06-05 NOTE — TELEPHONE ENCOUNTER
Caller: Regina Chavez    Relationship: Self    Best call back number:     Who are you requesting to speak with (clinical staff, provider,  specific staff member): DR OTT    What was the call regarding: THE PATIENT WOULD LIKE TO KNOW IF DR OTT THINKS SHE HAS WAITED TOO LONG TO HAVE A RIGHT KNEE REPLACEMENT    Is it okay if the provider responds through MyChart: NO, CALL PREFERRED

## 2023-06-06 NOTE — TELEPHONE ENCOUNTER
Caller: Regina Chavez    Relationship to patient: Self    Best call back number: [phone]    Patient is needing: PATIENT CALLING FOR DETAILS ON WHEN AND WHAT TIME HER SX IS

## 2023-06-13 ENCOUNTER — OFFICE VISIT (OUTPATIENT)
Dept: ORTHOPEDIC SURGERY | Facility: CLINIC | Age: 73
End: 2023-06-13
Payer: MEDICARE

## 2023-06-13 ENCOUNTER — TELEPHONE (OUTPATIENT)
Dept: ORTHOPEDIC SURGERY | Facility: CLINIC | Age: 73
End: 2023-06-13

## 2023-06-13 VITALS — TEMPERATURE: 97.5 F | BODY MASS INDEX: 28.36 KG/M2 | HEIGHT: 61 IN | WEIGHT: 150.2 LBS

## 2023-06-13 DIAGNOSIS — M25.562 LEFT KNEE PAIN, UNSPECIFIED CHRONICITY: Primary | ICD-10-CM

## 2023-06-13 PROBLEM — S32.010A COMPRESSION FRACTURE OF L1 LUMBAR VERTEBRA: Status: ACTIVE | Noted: 2020-05-13

## 2023-06-13 PROBLEM — I10 ESSENTIAL HYPERTENSION: Status: ACTIVE | Noted: 2017-10-23

## 2023-06-13 PROBLEM — Z00.00 ROUTINE HISTORY AND PHYSICAL EXAMINATION OF ADULT: Status: ACTIVE | Noted: 2019-10-28

## 2023-06-13 PROBLEM — J45.991 COUGH VARIANT ASTHMA: Status: ACTIVE | Noted: 2020-12-08

## 2023-06-13 PROBLEM — K21.9 GERD (GASTROESOPHAGEAL REFLUX DISEASE): Status: ACTIVE | Noted: 2019-10-25

## 2023-06-13 PROBLEM — K44.9 HIATAL HERNIA: Status: ACTIVE | Noted: 2022-08-05

## 2023-06-13 PROBLEM — E55.9 VITAMIN D DEFICIENCY: Status: ACTIVE | Noted: 2017-10-23

## 2023-06-13 PROBLEM — G47.33 OSA (OBSTRUCTIVE SLEEP APNEA): Status: ACTIVE | Noted: 2020-12-08

## 2023-06-13 PROCEDURE — 99214 OFFICE O/P EST MOD 30 MIN: CPT | Performed by: ORTHOPAEDIC SURGERY

## 2023-06-13 RX ORDER — SENNOSIDES 8.6 MG
650 CAPSULE ORAL
COMMUNITY

## 2023-06-13 RX ORDER — MELOXICAM 15 MG/1
TABLET ORAL
COMMUNITY

## 2023-06-13 RX ORDER — NITROFURANTOIN 25; 75 MG/1; MG/1
1 CAPSULE ORAL EVERY 12 HOURS SCHEDULED
COMMUNITY
Start: 2023-05-08

## 2023-06-13 RX ORDER — PANTOPRAZOLE SODIUM 40 MG/1
1 TABLET, DELAYED RELEASE ORAL 2 TIMES DAILY
COMMUNITY
Start: 2023-01-19

## 2023-06-13 RX ORDER — VIT C/B6/B5/MAGNESIUM/HERB 173 50-5-6-5MG
CAPSULE ORAL
COMMUNITY

## 2023-06-13 RX ORDER — AMLODIPINE BESYLATE 10 MG/1
TABLET ORAL
COMMUNITY

## 2023-06-13 NOTE — PROGRESS NOTES
Patient: Regina Chavez  YOB: 1950 73 y.o. female  Medical Record Number: [id number]    Chief Complaints:   Chief Complaint   Patient presents with   • Right Knee - Follow-up       History of Present Illness:Regina Chavez is a 73 y.o. female who presents for follow-up of right knee which is still giving her considerable problems.  Her major issue is her stiffness and feelings of instability.  She does have discomfort but will with certain positions or activities.  Again major complaint is the knee is becoming increasingly more stiff and is interfering with her basic activities of daily living.    Allergies: No Known Allergies    Medications:   Current Outpatient Medications   Medication Sig Dispense Refill   • amLODIPine (NORVASC) 10 MG tablet amlodipine 10 mg tablet     • Cholecalciferol (VITAMIN D3) 125 MCG (5000 UT) capsule capsule Take 1 capsule by mouth Daily.     • lisinopril-hydrochlorothiazide (PRINZIDE,ZESTORETIC) 10-12.5 MG per tablet Take 1 tablet by mouth Daily.     • meloxicam (MOBIC) 15 MG tablet meloxicam 15 mg tablet   Take 1 tablet by mouth once daily     • Multiple Vitamins-Minerals (WOMENS 50+ MULTI VITAMIN/MIN PO) Take  by mouth.     • pantoprazole (PROTONIX) 40 MG EC tablet Take 1 tablet by mouth.     • raloxifene (EVISTA) 60 MG tablet Take 1 tablet by mouth Daily.     • Turmeric 500 MG capsule Take  by mouth.     • acetaminophen (TYLENOL) 650 MG 8 hr tablet Take 1 tablet by mouth. (Patient not taking: Reported on 6/13/2023)     • alendronate (FOSAMAX) 70 MG tablet Take 1 tablet by mouth Every 7 (Seven) Days. (Patient not taking: Reported on 6/13/2023)     • amLODIPine (NORVASC) 5 MG tablet Take 1 tablet by mouth Daily. (Patient not taking: Reported on 6/13/2023)     • benzonatate (TESSALON) 100 MG capsule  (Patient not taking: Reported on 6/13/2023)     • budesonide (PULMICORT) 180 MCG/ACT inhaler Inhale 1 puff. (Patient not taking: Reported on 6/13/2023)     • celecoxib  (CeleBREX) 200 MG capsule Take 1 capsule by mouth Daily. (Patient not taking: Reported on 6/13/2023)     • cyclobenzaprine (FLEXERIL) 10 MG tablet Take 1 tablet by mouth 3 (Three) Times a Day As Needed for Muscle Spasms. (Patient not taking: Reported on 6/13/2023) 30 tablet 0   • Dexilant 60 MG capsule  (Patient not taking: Reported on 6/13/2023)     • dexlansoprazole (DEXILANT) 60 MG capsule Take 1 capsule by mouth Daily. (Patient not taking: Reported on 6/13/2023)     • eszopiclone (LUNESTA) 2 MG tablet TAKE 1 TABLET BY MOUTH AT BEDTIME (Patient not taking: Reported on 6/13/2023)     • eszopiclone (LUNESTA) 3 MG tablet Take 1 tablet by mouth Every Night. (Patient not taking: Reported on 6/13/2023)     • famotidine (PEPCID) 40 MG tablet Take 1 tablet by mouth Daily. (Patient not taking: Reported on 6/13/2023)     • Fluticasone Propionate, Inhal, (Flovent Diskus) 250 MCG/BLIST aerosol powder  Inhale 1 Act.     • HYDROcodone-acetaminophen (NORCO) 5-325 MG per tablet Take 1 tablet by mouth Every 6 (Six) Hours As Needed for Moderate Pain . (Patient not taking: Reported on 6/13/2023) 8 tablet 0   • Liraglutide (Saxenda) 18 MG/3ML injection pen Inject 3 mg under the skin into the appropriate area as directed Daily. (Patient not taking: Reported on 6/13/2023)     • nitrofurantoin, macrocrystal-monohydrate, (MACROBID) 100 MG capsule Take 1 capsule by mouth Every 12 (Twelve) Hours. (Patient not taking: Reported on 6/13/2023)     • pantoprazole (PROTONIX) 40 MG EC tablet Take 1 tablet by mouth 2 (Two) Times a Day. (Patient not taking: Reported on 6/13/2023)     • polyethylene glycol (GoLYTELY) 236 g solution peg 3350-electrolytes 236 gram-22.74 gram-6.74 gram-5.86 gram solution (Patient not taking: Reported on 6/13/2023)     • predniSONE (DELTASONE) 20 MG tablet TAKE 2 TABLETS BY MOUTH ONCE DAILY FOR 5 DAYS (Patient not taking: Reported on 6/13/2023)     • Pumpkin Seed-Soy Germ (AZO BLADDER CONTROL/GO-LESS PO) Take  by  "mouth. (Patient not taking: Reported on 6/13/2023)     • raloxifene (EVISTA) 60 MG tablet Take 60 mg by mouth Daily.     • rOPINIRole (REQUIP) 0.5 MG tablet TAKE 1 TABLET BY MOUTH EVERY DAY 2 HOURS BEFORE BEDTIME (Patient not taking: Reported on 6/13/2023)       No current facility-administered medications for this visit.         The following portions of the patient's history were reviewed and updated as appropriate: allergies, current medications, past family history, past medical history, past social history, past surgical history and problem list.    Review of Systems:   Pertinent positives/negative listed in HPI above    Physical Exam:   Vitals:    06/13/23 1133   Temp: 97.5 °F (36.4 °C)   Weight: 68.1 kg (150 lb 3.2 oz)   Height: 154.9 cm (61\")   PainSc:   7   PainLoc: Knee       General: A and O x 3, ASA, NAD      Knee Exam List: Knee:  right    ALIGNMENT:     Valgus      GAIT:    Antalgic    SKIN:    No abnormality    RANGE OF MOTION:   10  -  112   DEG    STRENGTH:   4  / 5    LIGAMENTS:    No varus / valgus instability.   Negative  Lachman.    MENISCUS:     Negative   Kevon       DISTAL PULSES:    Paplable    DISTAL SENSATION :   Intact    LYMPHATICS:     No   lymphadenopathy    OTHER:          - No effusion      - Crepitance with ROM     Radiology:  Xrays 3views right knee(ap,lateral, sunrise) were ordered and reviewed for evaluation of knee pain demonstrating advanced valgus osteoarthritis with bone on bone articulation, subchondral cysts, and periarticular osteophytes  todays xrays were compared to previous xrays and demonstrate no change    Assessment/Plan:  Right knee OA  Knee Plan List: Continuation of conservative management vs. TKA discussed.  The patient wishes to proceed with total knee replacement.  At this point the patient has failed the full compliment of conservative treatment and stating complete understanding of the risks/benefits/ anternatives wishes to proceed with surgical " treatment.    Risk and benefits of surgery were reviewed.  Including, but not limited to, blood clots or pulmonary embolism, anesthesia risk, infection, fracture, skin/leg numbness, persistent pain/crepitance/popping/catching, failure of the implant, need for future surgeries, hematoma, possible nerve or blood vessel injury, need for transfusion, and potential risk of stroke,heart attack or death, among others.  The patient understands and wishes to proceed.     It was explained that if tissue has been repaired or reconstructed, there is also an increased chance of failure which may require further management.  Following the completion of the discussion, the patient expressed understanding of this planned course of care, all their questions were answered and consent will be obtained preoperatively.    Operative Plan: [facility] Total Knee Replacement an overnight stay with home health rehab - she is focused on her stiffness and I explained that I cannot guarantee improvement in her stiffness , that would be very dependant upon PT. - -will call back if ready to proceed.     There are no diagnoses linked to this encounter.    Nathanael Shahid MD  6/13/2023

## 2023-06-13 NOTE — TELEPHONE ENCOUNTER
Caller: [place]    Relationship: SELF    Best call back number:[phone]    What form or medical record are you requesting: VISIT SUMMARY FROM 06/13    Who is requesting this form or medical record from you: HER    How would you like to receive the form or medical records (pick-up, mail, fax):     If pick-up, provide patient with address and location details    Timeframe paperwork needed: ASAP

## 2023-06-14 ENCOUNTER — TELEPHONE (OUTPATIENT)
Dept: ORTHOPEDIC SURGERY | Facility: CLINIC | Age: 73
End: 2023-06-14
Payer: MEDICARE

## 2023-06-14 NOTE — TELEPHONE ENCOUNTER
Patient will contact the office on Monday 6-19-23-come by to sign a release of medical record and needs the office note from 6-13-23.

## 2023-06-14 NOTE — TELEPHONE ENCOUNTER
PATIENT ALSO WANTS TO KNOW WHEN SHE NEEDS TO GET A CDC.  CAN WE PLEASE ADDRESS ALL HER CONCERNS WHEN WE CALL BACK

## 2023-06-14 NOTE — TELEPHONE ENCOUNTER
Caller: LETICIA    Relationship: SELF    Best call back number: [phone]      What was the call regarding: PATIENT IS WANTING TO KNOW HOW LONG DR. OTT WANTS HER TO GO TO PT WITH [facility].

## 2023-06-15 ENCOUNTER — TELEPHONE (OUTPATIENT)
Dept: ORTHOPEDIC SURGERY | Facility: CLINIC | Age: 73
End: 2023-06-15

## 2023-06-15 ENCOUNTER — TELEPHONE (OUTPATIENT)
Dept: ORTHOPEDIC SURGERY | Facility: CLINIC | Age: 73
End: 2023-06-15
Payer: MEDICARE

## 2023-06-15 ENCOUNTER — PREP FOR SURGERY (OUTPATIENT)
Dept: OTHER | Facility: HOSPITAL | Age: 73
End: 2023-06-15

## 2023-06-15 DIAGNOSIS — M17.11 PRIMARY OSTEOARTHRITIS OF RIGHT KNEE: Primary | ICD-10-CM

## 2023-06-15 NOTE — TELEPHONE ENCOUNTER
Caller: Regina Chavez    Relationship to patient: Self    Best call back number: [phone]    Patient is needing: WOULD LIKE TO KNOW IF SHE NEEDS TO CONTINUE PT

## 2023-06-15 NOTE — TELEPHONE ENCOUNTER
I have called and explained to Mrs. Chavez that she has limited PT sessions due to her insurance. So we will hold former physical therapy until after surgery.

## 2023-06-15 NOTE — TELEPHONE ENCOUNTER
Caller: PATIENT    Relationship to patient: SELF    Best call back number: [phone]    Patient is needing: PATIENT WAS CALLING TO FIND OUT IF SHE HAS BEEN SCHEDULED FOR SURGERY WITH DR. OTT YET? PATIENT STATED SHE THOUGHT HER SURGERY WAS SCHEDULED FOR AUGUST BUT WAS CALLING TO CONFIRM THAT. THANK YOU!

## 2023-06-21 PROBLEM — M17.11 PRIMARY OSTEOARTHRITIS OF RIGHT KNEE: Status: ACTIVE | Noted: 2023-06-21

## 2023-07-26 ENCOUNTER — TELEPHONE (OUTPATIENT)
Dept: ORTHOPEDIC SURGERY | Facility: CLINIC | Age: 73
End: 2023-07-26
Payer: MEDICARE

## 2023-07-26 NOTE — TELEPHONE ENCOUNTER
Patient is calling in wanting to cancel surgery. Patient states that right knee is not bothering her at the moment and want to hold off on surgery. Good call back number to further discuss with patient [phone].

## 2024-02-16 NOTE — PROGRESS NOTES
Subjective   History of Present Illness: Regina Chavez is a 69 y.o. female is here today for follow-up. She was last seen in office 10/18/2018 for p/o visit from T12 kyphoplasty on October 1, 2018 by Dr. Warren.     Today via telephone she is having some back pain. She had a fall approximately a year ago. She reports she has stiffness in her back. She denies any leg pain, numbness, tingling and no bowel or bladder incontinence.    You have chosen to receive care through a telephone visit today. Do you consent to use a telephone visit for your medical care today? Yes     History of Present Illness    She denies any falls, accidents or injuries.  He has had chronic low back pain for approximately the past year.  She describes it as an achy stiffness.  She denies any radiating leg pain, numbness, tingling or weakness.  She denies any bowel or bladder issues with the exception of urinary frequency.  She took a Celebrex that was prescribed by her orthopedic surgeon for knee issues and states that she is now had resolution of her back discomfort.  She is requesting a referral to physical therapy as well as a referral to pain management for consideration of an epidural injection.  She had lumbar x-rays at [facility] on March 25.      The following portions of the patient's history were reviewed and updated as appropriate: allergies, current medications, past family history, past medical history, past social history, past surgical history and problem list.    Review of Systems   Respiratory: Negative for chest tightness and shortness of breath.    Cardiovascular: Negative for chest pain.   Musculoskeletal: Positive for back pain (Stiffness).   Neurological: Negative for weakness and numbness.       Objective       Physical Exam  Neurologic Exam        Assessment/Plan   Independent Review of Radiographic Studies:    I personally reviewed the images from the following studies.  Lumbar x-rays from [facility] dated March 25  revealed mild lumbar disc degeneration with severe facet osteoarthritis at L4-5 and L5-S1.  There is also grade 1 anterolisthesis of L4 on 5.  Chronic T12 compression fracture identified no other compression deformities.  No acute abnormalities.      Medical Decision Making:    Called and spoke with the patient and we discussed her above-noted symptoms and issues.  She reports chronic low back pain that did resolve with the use of a Celebrex.  I encouraged her to continue taking this medication and/or other over-the-counter anti-inflammatories, such as Aleve or ibuprofen.  She was taking the medication for chronic knee pain prescribed by her orthopedist.  With regards to her request for referral to PT and pain management, I told her that due to the coronavirus pandemic, outpatient therapy and pain management offices are closed.  I did go ahead and get and give her an order for PT which she will use once the PT offices have reopened.  In the interim, I encouraged daily walking and exercise.  I told her to google low back exercises from a legitimate medical website, such as [employer].  Fortunately, the lumbar x-rays did not show any acute abnormalities but did reveal some chronic arthritic changes at L4-5 and L5-S1.  She does not have any radiating leg pain, radicular myelopathic symptomatology therefore, I do not feel that it is necessary to order lumbar MRI.  She will call back if the exercises do not help and/or she has any new problems.    Plan: Referral to physical therapy, return to office as needed        I spent approximately 15 minutes on the phone with this patient.    [place] was seen today for back pain.    Diagnoses and all orders for this visit:    Chronic midline low back pain without sciatica  -     Ambulatory Referral to Physical Therapy Evaluate and treat; Stretching, ROM, Strengthening; Full weight bearing      No follow-ups on file.          No

## 2024-05-30 ENCOUNTER — TELEPHONE (OUTPATIENT)
Dept: ORTHOPEDIC SURGERY | Facility: CLINIC | Age: 74
End: 2024-05-30

## 2024-05-30 NOTE — TELEPHONE ENCOUNTER
Caller: Regina Chavez    Relationship to patient: Self    Best call back number: [phone]    Chief complaint: RIGHT KNEE PAIN     Type of visit: FOLLOW UP     Requested date: ASAP      If rescheduling, when is the original appointment: NA      Additional notes: SOONEST AVAILABLE IS 08/06/24. PATIENT STATES SHE CAN NOT WAIT THAT LONG SHE IS AT [facility] NOW DUE TO THE PAIN.

## 2024-06-10 ENCOUNTER — TELEPHONE (OUTPATIENT)
Dept: ORTHOPEDIC SURGERY | Facility: CLINIC | Age: 74
End: 2024-06-10

## 2024-06-10 NOTE — TELEPHONE ENCOUNTER
Provider: DR OTT     Caller: PATIENT     Phone Number: [phone]    Reason for Call: PATIENT WOULD LIKE TO DISCUSS KNEE REPLACEMENT WITH DR OTT BUT WOULD LIKE TO KNOW IF SHE WOULD BE A CANDIDATE FOR SURGERY DUE TO HAVING OSTEOPOROSIS. PLEASE ADVISE.

## 2024-06-11 NOTE — TELEPHONE ENCOUNTER
Please notify the patient that Dr. Shahid is 2 months booked out from the date of their appointment.  If she needs to be seen sooner and have a knee replacement I would recommend that we schedule her to see Dr. Vanegas.

## 2024-06-11 NOTE — TELEPHONE ENCOUNTER
Caller: Regina Chavez    Relationship to patient: Self    Best call back number: [phone]*    Patient is needing: PT IS WANTING TO KNOW IF THERE IS A TIME LIMIT ON WHEN SHE CAN HAVE KNEE REPLACEMENT SURGERY.. SHE DOES NOT WANT TO WAIT TOO LONG..  PT WOULD LIKE CALL BACK.. PLEASE ADVISE..

## 2024-07-29 ENCOUNTER — TELEPHONE (OUTPATIENT)
Dept: ORTHOPEDIC SURGERY | Facility: CLINIC | Age: 74
End: 2024-07-29
Payer: MEDICARE

## 2024-07-29 NOTE — TELEPHONE ENCOUNTER
Caller: SANDRA VILLAFANA     Phone Number: [phone] (home)     Reason for Call:   PATIENT WOULD LIKE TO GET MRI BEFORE APPOINTMENT. PATIENT IS BOOKED FOR TOMORROW 7-30-24 @3PM

## 2024-07-29 NOTE — TELEPHONE ENCOUNTER
Tried to call patient, left voicemail and sent patient [employer] message. Patient has an appointment with Mckinley TOM tomorrow 7- at [facility].

## 2024-09-06 ENCOUNTER — TELEPHONE (OUTPATIENT)
Dept: ORTHOPEDIC SURGERY | Facility: CLINIC | Age: 74
End: 2024-09-06
Payer: MEDICARE

## 2024-09-06 NOTE — TELEPHONE ENCOUNTER
"Provider: DR OTT    Caller: SANDRA VILLAFANA    Relationship to Patient: PATIENT     Phone Number: [phone] (home)  [phone]     Reason for Call: RIGHT KNEE - PATIENT STATES SHE HAS COMPLETED \"REHAB AT [facility]\" AND WANTS TO KNOW NEXT STEPS     When was the patient last seen: 06/15/2023    "

## 2024-09-09 NOTE — TELEPHONE ENCOUNTER
Per Dr. Shahid the neck step would be a total knee replacement.  She is post to let us know whenever she is ready to proceed forward.

## 2024-10-29 ENCOUNTER — TELEPHONE (OUTPATIENT)
Dept: ORTHOPEDIC SURGERY | Facility: CLINIC | Age: 74
End: 2024-10-29
Payer: MEDICARE

## 2024-10-29 NOTE — TELEPHONE ENCOUNTER
PATIENT NO SHOWED HER APPT THIS MORNING WITH CAR. LEFT VOICE MESSAGE TO GET R/S. SENT N/S LETTER VIA [employer] AS WELL.    IF PATIENT CALLS BACK, PLEASE GET MISSED APPT R/S. THANK YOU!

## 2024-10-29 NOTE — TELEPHONE ENCOUNTER
This patient has not been seen in over a year.  She will need an updated office visit with a new case request to be placed.  Please schedule her an appointment.

## 2024-10-29 NOTE — TELEPHONE ENCOUNTER
Caller: Regina Chavez    Relationship to patient: Self    Best call back number: [phone] (home)     Chief complaint: RT KNEE PAIN    Type of visit: KNEE REPLACEMENT    Requested date: ASAP     Additional notes: MS CHAVEZ WOULD LIKE TO KNOW IF SHE COULD GO AHEAD AND SCHEDULE SX OR IF SHE NEEDS TO SEE DR NAMRATA REED

## 2024-11-07 ENCOUNTER — TELEPHONE (OUTPATIENT)
Dept: ORTHOPEDIC SURGERY | Facility: CLINIC | Age: 74
End: 2024-11-07

## 2024-11-07 NOTE — TELEPHONE ENCOUNTER
Caller: Regina Chavez    Relationship to patient: Self    Best call back number:     Chief complaint: RIGHT KNEE    Type of visit: FUP     Requested date: ASAP     If rescheduling, when is the original appointment: 12/24/24     Additional notes:PATIENT HAS RIGHT KNEE FUP SCHEDULED FOR DARYL JONES BUT WOULD LIKE TO BE SEEN SOONER IF POSSIBLE

## 2024-11-07 NOTE — TELEPHONE ENCOUNTER
Caller: Regina Chavez    Relationship: Self    Best call back number: [phone]    What form or medical record are you requesting: OFFICE NOTE FROM 06/13/23    How would you like to receive the form or medical records (pick-up, mail, fax):     Timeframe paperwork needed: ASAP

## 2024-11-07 NOTE — TELEPHONE ENCOUNTER
Hub staff attempted to follow warm transfer process and was unsuccessful     Caller: Regina Chavez    Relationship to patient: Self    Best call back number: [phone]    Patient is needing: PATIENT IS HAVING INCREASED STIFFNESS IN THE RIGTH KNEE AND WOULD LIKE TO BE  SEEN ASAP.

## 2024-11-13 ENCOUNTER — TELEPHONE (OUTPATIENT)
Dept: ORTHOPEDIC SURGERY | Facility: CLINIC | Age: 74
End: 2024-11-13
Payer: MEDICARE

## 2024-11-13 NOTE — TELEPHONE ENCOUNTER
If I have an available opening earlier than her current appointment, you can put her on my schedule on a day where Dr. Shahid is also in the clinic and I can grab him to answer any final questions about surgery.  This would be the only option we have to get her in sooner.

## 2024-11-13 NOTE — TELEPHONE ENCOUNTER
Provider: NAMRATA    Caller: Regina Chavez    Relationship to Patient: Self    Pharmacy:     Phone Number: [phone]    Reason for Call: PT CALLED TO SEE IF SHE CAN GET IN SOONER, SHE DOESN'T WANT TO PUT OFF GETTING A KNEE REPLACEMENT SO LONG TO THE POINT SHE CAN GET IT ANYMORE BECAUSE IT TOOK TOO LONG TO GET IN - PT WANTS TO KNOW HOW LONG SHE CAN GO WITH OUT GETTING ONE BEFORE ITS TOO LATE

## 2024-11-18 ENCOUNTER — TELEPHONE (OUTPATIENT)
Dept: ORTHOPEDIC SURGERY | Facility: CLINIC | Age: 74
End: 2024-11-18

## 2024-11-18 NOTE — TELEPHONE ENCOUNTER
Hub staff attempted to follow warm transfer process and was unsuccessful     Caller: Regina Chavez    Relationship to patient: Self    Best call back number: [phone]    Patient is needing: PT CALLING TO GET SOONER APPT WITH PIA AHMADI. PT CURRENTLY HAS APPT ON 12/12/24. PT WOULD ALSO LIKE TO KNOW IF SHE NEEDS TO SEE DR OTT INSTEAD IF PT IS TO BE CONSIDERED FOR SURGERY. PLEASE CONTACT PT TO DISCUSS.

## 2024-11-21 ENCOUNTER — TELEPHONE (OUTPATIENT)
Dept: ORTHOPEDIC SURGERY | Facility: CLINIC | Age: 74
End: 2024-11-21

## 2024-11-21 NOTE — TELEPHONE ENCOUNTER
Typically normal.  If we have a patient that comes into the office already in a wheelchair that can certainly compromise outcome, but I have reviewed patient's case and bone quality still looks very good and patient is still mobile, would be happy to further discuss surgical options

## 2024-11-21 NOTE — TELEPHONE ENCOUNTER
Caller: Regina Chavez    Relationship: Self    Best call back number:     Who are you requesting to speak with (clinical staff, provider,  specific staff member): CLINICAL     What was the call regarding: MS CHAVEZ WOULD LIKE TO KNOW IF THERE IS SUCH A THING AS WAITING TOO LONG TO HAVE HER KNEE REPLACED    Is it okay if the provider responds through MyChart: CALL

## 2024-11-25 NOTE — TELEPHONE ENCOUNTER
Attemped to call patient and it went to voicemail and it was full. So I sent patient a "[employer]t message with info from Claritza

## 2024-12-12 ENCOUNTER — OFFICE VISIT (OUTPATIENT)
Dept: ORTHOPEDIC SURGERY | Facility: CLINIC | Age: 74
End: 2024-12-12
Payer: MEDICARE

## 2024-12-12 VITALS — BODY MASS INDEX: 26.43 KG/M2 | TEMPERATURE: 97.5 F | WEIGHT: 140 LBS | HEIGHT: 61 IN

## 2024-12-12 DIAGNOSIS — M17.11 PRIMARY OSTEOARTHRITIS OF RIGHT KNEE: ICD-10-CM

## 2024-12-12 DIAGNOSIS — R52 PAIN: Primary | ICD-10-CM

## 2024-12-12 PROCEDURE — 99214 OFFICE O/P EST MOD 30 MIN: CPT | Performed by: NURSE PRACTITIONER
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
## 2024-12-24 ENCOUNTER — OFFICE VISIT (OUTPATIENT)
Dept: ORTHOPEDIC SURGERY | Facility: CLINIC | Age: 74
End: 2024-12-24
Payer: MEDICARE

## 2024-12-24 VITALS — BODY MASS INDEX: 26.66 KG/M2 | TEMPERATURE: 98.4 F | WEIGHT: 141.2 LBS | HEIGHT: 61 IN

## 2024-12-24 DIAGNOSIS — M17.11 PRIMARY OSTEOARTHRITIS OF RIGHT KNEE: Primary | ICD-10-CM

## 2024-12-24 PROBLEM — M17.9 OA (OSTEOARTHRITIS) OF KNEE: Status: ACTIVE | Noted: 2024-12-24

## 2024-12-24 PROCEDURE — 99214 OFFICE O/P EST MOD 30 MIN: CPT | Performed by: ORTHOPAEDIC SURGERY

## 2024-12-24 PROCEDURE — 1160F RVW MEDS BY RX/DR IN RCRD: CPT | Performed by: ORTHOPAEDIC SURGERY

## 2024-12-24 PROCEDURE — 1159F MED LIST DOCD IN RCRD: CPT | Performed by: ORTHOPAEDIC SURGERY

## 2024-12-24 RX ORDER — AMLODIPINE BESYLATE 10 MG/1
10 TABLET ORAL
COMMUNITY

## 2024-12-24 RX ORDER — SACCHAROMYCES BOULARDII 250 MG
250 CAPSULE ORAL 2 TIMES DAILY
COMMUNITY

## 2024-12-24 RX ORDER — DONEPEZIL HYDROCHLORIDE 10 MG/1
10 TABLET, FILM COATED ORAL NIGHTLY
COMMUNITY

## 2024-12-24 RX ORDER — CHLORHEXIDINE GLUCONATE 500 MG/1
CLOTH TOPICAL 2 TIMES DAILY
OUTPATIENT
Start: 2024-12-24

## 2024-12-24 RX ORDER — LOSARTAN POTASSIUM AND HYDROCHLOROTHIAZIDE 12.5; 5 MG/1; MG/1
1 TABLET ORAL DAILY
COMMUNITY

## 2024-12-24 RX ORDER — MIRTAZAPINE 15 MG/1
15 TABLET, ORALLY DISINTEGRATING ORAL NIGHTLY
COMMUNITY

## 2024-12-24 RX ORDER — RALOXIFENE HYDROCHLORIDE 60 MG/1
1 TABLET, FILM COATED ORAL DAILY
COMMUNITY
End: 2024-12-24

## 2024-12-24 RX ORDER — PREGABALIN 150 MG/1
150 CAPSULE ORAL ONCE
OUTPATIENT
Start: 2024-12-24 | End: 2024-12-24

## 2024-12-24 RX ORDER — TRAZODONE HYDROCHLORIDE 50 MG/1
50 TABLET, FILM COATED ORAL NIGHTLY
COMMUNITY

## 2024-12-24 NOTE — PROGRESS NOTES
Patient: Regina Chavez  YOB: 1950 74 y.o. female  Medical Record Number: [id number]    Chief Complaints:   Chief Complaint   Patient presents with    Right Knee - Follow-up       History of Present Illness:Regina Chavez is a 74 y.o. female who presents with right knee pain severe stiffness and pain she has trouble walking across the room injections have been short-lived she has done physical therapy.  She had a femoral nail by Dr Williamson and reportedly was on his schedule but for some reasons was canceled.    Allergies: No Known Allergies    Medications:   Current Outpatient Medications   Medication Sig Dispense Refill    amLODIPine (NORVASC) 10 MG tablet 1 tablet.      Cholecalciferol (VITAMIN D3) 125 MCG (5000 UT) capsule capsule Take 1 capsule by mouth Daily.      donepezil (ARICEPT) 10 MG tablet Take 1 tablet by mouth Every Night.      losartan-hydrochlorothiazide (HYZAAR) 50-12.5 MG per tablet Take 1 tablet by mouth Daily.      melatonin 5 MG tablet tablet Take  by mouth.      meloxicam (MOBIC) 15 MG tablet meloxicam 15 mg tablet   Take 1 tablet by mouth once daily      mirtazapine (REMERON SOL-TAB) 15 MG disintegrating tablet Place 1 tablet on the tongue Every Night.      Multiple Vitamins-Minerals (WOMENS 50+ MULTI VITAMIN/MIN PO) Take  by mouth.      pantoprazole (PROTONIX) 40 MG EC tablet Take 1 tablet by mouth.      raloxifene (EVISTA) 60 MG tablet Take 1 tablet by mouth Daily.      saccharomyces boulardii (FLORASTOR) 250 MG capsule Take 1 capsule by mouth 2 (Two) Times a Day.      traZODone (DESYREL) 50 MG tablet Take 1 tablet by mouth Every Night.       No current facility-administered medications for this visit.         The following portions of the patient's history were reviewed and updated as appropriate: allergies, current medications, past family history, past medical history, past social history, past surgical history and problem list.    Review of Systems:   Pertinent  "positives/negatives listed in HPI above    Physical Exam:   Vitals:    12/24/24 1040   Temp: 98.4 °F (36.9 °C)   TempSrc: Temporal   Weight: 64 kg (141 lb 3.2 oz)   Height: 154.9 cm (61\")   PainSc: 0-No pain   PainLoc: Knee       General: A and O x 3, ASA, NAD      Knee Exam List: Knee:  right    ALIGNMENT:     Valgus      GAIT:    Antalgic    SKIN:    No abnormality    RANGE OF MOTION:   15  -  109   DEG    STRENGTH:   4  / 5    LIGAMENTS:    No varus / valgus instability.   Negative  Lachman.    MENISCUS:     Negative   Kevon       DISTAL PULSES:    Paplable    DISTAL SENSATION :   Intact    LYMPHATICS:     No   lymphadenopathy    OTHER:          - Positive   effusion      - Crepitance with ROM        Radiology:  Xrays 3views right knee (ap,lateral, sunrise) taken previously demonstrating advanced valgus osteoarthritis with bone on bone articulation, subchondral cysts, and periarticular osteophytes there is routine femoral nail ending just above the knee joint    Assessment/Plan: Right knee advanced end-stage osteoarthritis with retained femoral nail.  Knee Plan List: Continuation of conservative management vs. TKA discussed.  The patient wishes to proceed with total knee replacement.  At this point the patient has failed the full compliment of conservative treatment and stating complete understanding of the risks/benefits/ anternatives wishes to proceed with surgical treatment.    Risk and benefits of surgery were reviewed.  Including, but not limited to, blood clots or pulmonary embolism, anesthesia risk, infection, fracture, skin/leg numbness, persistent pain/crepitance/popping/catching, failure of the implant, need for future surgeries, hematoma, possible nerve or blood vessel injury, need for transfusion, and potential risk of stroke,heart attack or death, among others.  The patient understands and wishes to proceed.     It was explained that if tissue has been repaired or reconstructed, there is also an " increased chance of failure which may require further management.  Following the completion of the discussion, the patient expressed understanding of this planned course of care, all their questions were answered and consent will be obtained preoperatively.    Operative Plan: Smith and Nephzoie Oxinium Total Knee Replacement an overnight stay with home health rehab-open the retained femoral nail we would need to do robotic assistance to assist with implant placement.  She has had multiple falls infections wearing kneepads today in anticipation or protection of potential falls so I explained to her that she would likely be at high risk of falls postoperatively.  She will need to use a walker and I am sending her back to physical therapy to work on prehab and balance training currently.    There are no diagnoses linked to this encounter.     Nathanael Shahid MD  12/24/2024

## 2024-12-30 ENCOUNTER — TREATMENT (OUTPATIENT)
Dept: PHYSICAL THERAPY | Facility: CLINIC | Age: 74
End: 2024-12-30
Payer: MEDICARE

## 2024-12-30 DIAGNOSIS — M17.11 PRIMARY OSTEOARTHRITIS OF RIGHT KNEE: Primary | ICD-10-CM

## 2024-12-30 DIAGNOSIS — G89.29 CHRONIC PAIN OF RIGHT KNEE: ICD-10-CM

## 2024-12-30 DIAGNOSIS — M25.561 CHRONIC PAIN OF RIGHT KNEE: ICD-10-CM

## 2024-12-30 DIAGNOSIS — Z74.09 IMPAIRED FUNCTIONAL MOBILITY AND ACTIVITY TOLERANCE: ICD-10-CM
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
## 2025-01-02 ENCOUNTER — TREATMENT (OUTPATIENT)
Dept: PHYSICAL THERAPY | Facility: CLINIC | Age: 75
End: 2025-01-02
Payer: MEDICARE

## 2025-01-02 DIAGNOSIS — M17.11 PRIMARY OSTEOARTHRITIS OF RIGHT KNEE: Primary | ICD-10-CM

## 2025-01-02 DIAGNOSIS — M25.561 CHRONIC PAIN OF RIGHT KNEE: ICD-10-CM

## 2025-01-02 DIAGNOSIS — G89.29 CHRONIC PAIN OF RIGHT KNEE: ICD-10-CM

## 2025-01-02 DIAGNOSIS — Z74.09 IMPAIRED FUNCTIONAL MOBILITY AND ACTIVITY TOLERANCE: ICD-10-CM

## 2025-01-02 NOTE — PROGRESS NOTES
Physical Therapy Daily Treatment Note  [facility] Physical Therapy [facility]  [address], [address]  [place] [address]    Patient: Regina Chavez   : 1950  Referring practitioner: VAISHALI Perez  Today's Date: 2025  Patient seen for 2 sessions    Visit Diagnoses:    ICD-10-CM ICD-9-CM   1. Primary osteoarthritis of right knee  M17.11 715.16   2. Chronic pain of right knee  M25.561 719.46    G89.29 338.29   3. Impaired functional mobility and activity tolerance  Z74.09 V49.89       Subjective   Regina Chavez reports: compliance with her HEP.  States that she rode her bike for a couple of hours a day.  States that she has not had a recent falls but is very careful when she walks      Objective   Presents with rolling walker with slightly improved foot position at heel strike but still not quite to full heel strike    12* heel slide extension    See Exercise, Manual, and Modality Logs for complete treatment.     Patient Education: need for improved heel strike before progressing to a cane    Assessment/Plan  Patient with some increase in knee extension today.  Still not able to tolerate full knee extension.  Very resitricted in posterior capsule region.  Balance fair with side stepping    Progress strengthening /stabilization /functional activity           Timed:  Manual Therapy:    15     mins  85815;  Therapeutic Exercise:    15/30     mins  00756;     Neuromuscular Mikael:    10    mins  10129;    Therapeutic Activity:     0     mins  84128;     Ultrasound:      0     mins  98953;    Iontophoresis              __0_   mins  Dry Needling               _____   mins        Untimed:  Electrical Stimulation:    0    mins  74135 ( );  Mechanical Traction:             mins  01219;   Paraffin                       _____  mins     Timed Treatment:   40   mins   Total Treatment:     55   mins    Gretchen Lara, PT  [place] License # [id number]  Physical Therapist    Electronically  signed by Gretchen Lara, PT, 01/02/25, 10:29 AM EST

## 2025-01-09 ENCOUNTER — TREATMENT (OUTPATIENT)
Dept: PHYSICAL THERAPY | Facility: CLINIC | Age: 75
End: 2025-01-09
Payer: MEDICARE

## 2025-01-09 DIAGNOSIS — M17.11 PRIMARY OSTEOARTHRITIS OF RIGHT KNEE: Primary | ICD-10-CM

## 2025-01-09 DIAGNOSIS — G89.29 CHRONIC PAIN OF RIGHT KNEE: ICD-10-CM

## 2025-01-09 DIAGNOSIS — Z74.09 IMPAIRED FUNCTIONAL MOBILITY AND ACTIVITY TOLERANCE: ICD-10-CM

## 2025-01-09 DIAGNOSIS — M25.561 CHRONIC PAIN OF RIGHT KNEE: ICD-10-CM

## 2025-01-09 PROCEDURE — 97530 THERAPEUTIC ACTIVITIES: CPT | Performed by: PHYSICAL THERAPIST

## 2025-01-09 PROCEDURE — 97110 THERAPEUTIC EXERCISES: CPT | Performed by: PHYSICAL THERAPIST

## 2025-01-09 NOTE — PROGRESS NOTES
Physical Therapy Daily Treatment Note  [facility] Physical Therapy - [facility]  [address], [address]  [place] [address]    Patient: Regina Chavez   : 1950  Referring practitioner: VAISHALI Perez  Today's Date: 2025  Patient seen for 3 sessions    Visit Diagnoses:    ICD-10-CM ICD-9-CM   1. Primary osteoarthritis of right knee  M17.11 715.16   2. Chronic pain of right knee  M25.561 719.46    G89.29 338.29   3. Impaired functional mobility and activity tolerance  Z74.09 V49.89       Subjective   Regina Chavez reports: that she has been riding her bike about an hour a day and walks around the house about an hour at a time.        Objective   Patient walks maintaining the right knee in considerable flexion despite cueing to straighten it.    Able to flex knee freely, restriction in end range knee extension    See Exercise, Manual, and Modality Logs for complete treatment.     Patient Education:Constant cueing for proper exercise technqiue    Assessment/Plan  Patient is cooperative with therapy but does require constant cueing for proper exercise technique.  Not using available knee ROM when walking.  Conitnues to wear knee pads on each visit and does not want to remove them for fear of falling    Progress strengthening /stabilization /functional activity           Timed:  Manual Therapy:    0     mins  02715;  Therapeutic Exercise:    25     mins  44616;     Neuromuscular Mikael:    0    mins  09950;    Therapeutic Activity:     15     mins  97843;     Ultrasound:      0     mins  20764;    Iontophoresis              __0_   mins  Dry Needling               _____   mins        Untimed:  Electrical Stimulation:     0    mins  22730 ( );  Mechanical Traction:             mins  88947;   Paraffin                       _____  mins     Timed Treatment:   40   mins   Total Treatment:     50   mins    Gretchen Lara, PT  [place] License # [id number]  Physical  Therapist    Electronically signed by Gretchen Lara, PT, 01/09/25, 2:30 PM EST

## 2025-01-13 ENCOUNTER — TELEPHONE (OUTPATIENT)
Dept: ORTHOPEDIC SURGERY | Facility: CLINIC | Age: 75
End: 2025-01-13

## 2025-01-13 NOTE — TELEPHONE ENCOUNTER
"    Caller: Regina Chavez \"TERESA\"    Relationship to Patient: Self    Phone Number: [phone]    Reason for Call: SPOKE WITH PT - SHE WOULD LIKE TO KNOW A LITTLE BIT MORE ABOUT HER TOTAL KNEE SURGERY. SHE  IS ASKING ABOUT THE ROBOTIC SURGERY PROCEDURE.     PLEASE CONTACT PT AND ADVISE      "

## 2025-01-14 ENCOUNTER — TREATMENT (OUTPATIENT)
Dept: PHYSICAL THERAPY | Facility: CLINIC | Age: 75
End: 2025-01-14
Payer: MEDICARE

## 2025-01-14 DIAGNOSIS — M17.11 PRIMARY OSTEOARTHRITIS OF RIGHT KNEE: Primary | ICD-10-CM

## 2025-01-14 DIAGNOSIS — M25.561 CHRONIC PAIN OF RIGHT KNEE: ICD-10-CM

## 2025-01-14 DIAGNOSIS — G89.29 CHRONIC PAIN OF RIGHT KNEE: ICD-10-CM

## 2025-01-14 DIAGNOSIS — Z74.09 IMPAIRED FUNCTIONAL MOBILITY AND ACTIVITY TOLERANCE: ICD-10-CM

## 2025-01-14 PROCEDURE — 97110 THERAPEUTIC EXERCISES: CPT | Performed by: PHYSICAL THERAPIST

## 2025-01-14 PROCEDURE — 97140 MANUAL THERAPY 1/> REGIONS: CPT | Performed by: PHYSICAL THERAPIST

## 2025-01-14 PROCEDURE — 97112 NEUROMUSCULAR REEDUCATION: CPT | Performed by: PHYSICAL THERAPIST

## 2025-01-14 NOTE — PROGRESS NOTES
Physical Therapy Daily Treatment Note  [facility] Physical Therapy - [facility]  [address], [address]  [place] [address]    Patient: Regina Chavez   : 1950  Referring practitioner: VAISHALI Perez  Today's Date: 2025  Patient seen for 4 sessions    Visit Diagnoses:    ICD-10-CM ICD-9-CM   1. Primary osteoarthritis of right knee  M17.11 715.16   2. Chronic pain of right knee  M25.561 719.46    G89.29 338.29   3. Impaired functional mobility and activity tolerance  Z74.09 V49.89       Subjective   Regina Chavez reports: States that she is still not having much pain in the knees but is very stiff.   States that she uses her walker when out but not at home.  No falls recently.  Right knee just wont straighten out properly  Uses leg weight on her bike at home.      Objective   Patient walks with quick paced bent knee gait pattern with her walker    Knee heel slide - 15* - 131*    See Exercise, Manual, and Modality Logs for complete treatment.     Patient Education:- use ankle weights with leg lifts at home for further strengthening     Assessment/Plan  Yanique is very compliant with therapy.  Struggles with proper gait pattern as she does not focus well on proper heel strike pattern.  Needs constant reminders for proper form    Progress strengthening /stabilization /functional activity           Timed:  Manual Therapy:    14     mins  90915;  Therapeutic Exercise:    20/30     mins  48305;     Neuromuscular Mikael:    10    mins  13593;    Therapeutic Activity:     0     mins  21300;     Ultrasound:      0     mins  29179;    Iontophoresis              __0_   mins  Dry Needling               _____   mins        Untimed:  Electrical Stimulation:     0    mins  00045 ( );  Mechanical Traction:             mins  21642;   Paraffin                       _____  mins     Timed Treatment:   44   mins   Total Treatment:     60   mins    Gretchen Lara, PT  [place] License #  [id number]  Physical Therapist    Electronically signed by Gretchen Lara, PT, 01/14/25, 9:28 AM EST

## 2025-01-16 ENCOUNTER — TREATMENT (OUTPATIENT)
Dept: PHYSICAL THERAPY | Facility: CLINIC | Age: 75
End: 2025-01-16
Payer: MEDICARE

## 2025-01-16 DIAGNOSIS — Z74.09 IMPAIRED FUNCTIONAL MOBILITY AND ACTIVITY TOLERANCE: ICD-10-CM

## 2025-01-16 DIAGNOSIS — G89.29 CHRONIC PAIN OF RIGHT KNEE: ICD-10-CM

## 2025-01-16 DIAGNOSIS — M25.561 CHRONIC PAIN OF RIGHT KNEE: ICD-10-CM

## 2025-01-16 DIAGNOSIS — M17.11 PRIMARY OSTEOARTHRITIS OF RIGHT KNEE: Primary | ICD-10-CM

## 2025-01-16 NOTE — PROGRESS NOTES
Physical Therapy Daily Treatment Note  [facility] Physical Therapy - [facility]  [address], [address]  [place] [address]    Patient: Regina Chavez   : 1950  Referring practitioner: VAISHALI Perez  Today's Date: 2025  Patient seen for 5 sessions    Visit Diagnoses:    ICD-10-CM ICD-9-CM   1. Primary osteoarthritis of right knee  M17.11 715.16   2. Chronic pain of right knee  M25.561 719.46    G89.29 338.29   3. Impaired functional mobility and activity tolerance  Z74.09 V49.89       Subjective   Regina Chavez reports: that she has been trying to wlak around her house without her walker.      Objective   Presents walking with a nearly shuffling type gait pattern landing on right toe or foot flat rather than true heel strike    Able to land heel strike but requires cueing to do so    See Exercise, Manual, and Modality Logs for complete treatment.     Patient Education: need for proper gait pattern    Assessment/Plan  Patient compliant with therapy.  Her gait has improved with proper cueing as she is able to achieve heel strike.  Continues to wear knee pads at all times when up.  Many questions today about the stiffness that she will have after therapy    Progress per Plan of Care           Timed:  Manual Therapy:    10     mins  71026;  Therapeutic Exercise:    20/30     mins  87360;     Neuromuscular Mkiael:    0    mins  92030;    Therapeutic Activity:     10     mins  74837;     Ultrasound:      0     mins  78435;    Iontophoresis              __0_   mins  Dry Needling               _____   mins        Untimed:  Electrical Stimulation:     0    mins  57995 ( );  Mechanical Traction:             mins  73954;   Paraffin                       _____  mins     Timed Treatment:   40   mins   Total Treatment:     55   mins    Gretchen Lara PT  [place] License # [id number]  Physical Therapist    Electronically signed by Gretchen Lara PT, 25, 11:02 AM EST

## 2025-01-20 ENCOUNTER — TREATMENT (OUTPATIENT)
Dept: PHYSICAL THERAPY | Facility: CLINIC | Age: 75
End: 2025-01-20
Payer: MEDICARE

## 2025-01-20 DIAGNOSIS — Z74.09 IMPAIRED FUNCTIONAL MOBILITY AND ACTIVITY TOLERANCE: ICD-10-CM

## 2025-01-20 DIAGNOSIS — G89.29 CHRONIC PAIN OF RIGHT KNEE: ICD-10-CM

## 2025-01-20 DIAGNOSIS — M17.11 PRIMARY OSTEOARTHRITIS OF RIGHT KNEE: Primary | ICD-10-CM

## 2025-01-20 DIAGNOSIS — M25.561 CHRONIC PAIN OF RIGHT KNEE: ICD-10-CM

## 2025-01-20 PROCEDURE — 97112 NEUROMUSCULAR REEDUCATION: CPT | Performed by: PHYSICAL THERAPIST

## 2025-01-20 PROCEDURE — 97140 MANUAL THERAPY 1/> REGIONS: CPT | Performed by: PHYSICAL THERAPIST

## 2025-01-20 PROCEDURE — 97110 THERAPEUTIC EXERCISES: CPT | Performed by: PHYSICAL THERAPIST

## 2025-01-20 NOTE — PROGRESS NOTES
Physical Therapy Daily Treatment Note  [facility] Physical Therapy - [facility]  [address], [address]  [place] [address]    Patient: Regina Chavez   : 1950  Referring practitioner: VAISHALI Perez  Today's Date: 2025  Patient seen for 6 sessions    Visit Diagnoses:    ICD-10-CM ICD-9-CM   1. Primary osteoarthritis of right knee  M17.11 715.16   2. Chronic pain of right knee  M25.561 719.46    G89.29 338.29   3. Impaired functional mobility and activity tolerance  Z74.09 V49.89       Subjective   Regina Chavez reports: that she still has little pain in the knees but they feel stiff.  Right knee worse than the left.  No pain at all if not WB.      Objective   Walks with bent knee gait pattern - able to achieve heel strike but requires cueing for each step    See Exercise, Manual, and Modality Logs for complete treatment.     Patient Education: new knee extension stretch    Assessment/Plan  Yanique is very cooperative with therapy but her compliance to home exercises is in question due to her dementia.  She does walk around her house quite a  bit and ride her stationary bike but is not focusing on stretches or active knee extension exercises.      Progress strengthening /stabilization /functional activity           Timed:  Manual Therapy:    10     mins  69327;  Therapeutic Exercise:    20/30     mins  06519;     Neuromuscular Mikael:    10    mins  50190;    Therapeutic Activity:      0     mins  38007;     Ultrasound:      0     mins  48442;    Iontophoresis              __0_   mins  Dry Needling               _____   mins        Untimed:  Electrical Stimulation:     0    mins  02991 ( );  Mechanical Traction:             mins  23339;   Paraffin                       _____  mins     Timed Treatment:   40   mins   Total Treatment:     50   mins    Gretchen Lara, PT  [place] License # [id number]  Physical Therapist    Electronically signed by Gretchen Lara PT, 25,  11:56 AM EST

## 2025-01-22 ENCOUNTER — TREATMENT (OUTPATIENT)
Dept: PHYSICAL THERAPY | Facility: CLINIC | Age: 75
End: 2025-01-22
Payer: MEDICARE

## 2025-01-22 DIAGNOSIS — M25.561 CHRONIC PAIN OF RIGHT KNEE: ICD-10-CM

## 2025-01-22 DIAGNOSIS — Z74.09 IMPAIRED FUNCTIONAL MOBILITY AND ACTIVITY TOLERANCE: ICD-10-CM

## 2025-01-22 DIAGNOSIS — G89.29 CHRONIC PAIN OF RIGHT KNEE: ICD-10-CM

## 2025-01-22 DIAGNOSIS — M17.11 PRIMARY OSTEOARTHRITIS OF RIGHT KNEE: Primary | ICD-10-CM

## 2025-01-22 NOTE — PROGRESS NOTES
Physical Therapy Daily Treatment Note  1/22/2025  Visit Diagnoses:    ICD-10-CM ICD-9-CM   1. Primary osteoarthritis of right knee  M17.11 715.16   2. Chronic pain of right knee  M25.561 719.46    G89.29 338.29   3. Impaired functional mobility and activity tolerance  Z74.09 V49.89       VISIT#: 7      Regina Chavez reports: Her knee is about the same and it continues to be stiff. She is not having any pain in it.   Current Pain Level:    0/10  Affected Area:  stiffness in theR knee     Functional Deficits/Irritating Factors: ambulation, stairs, prolonged positioning and lifting     Compliance with HEP Reported: Yes    Objective  Presents: ambulating limited knee extension on R. Decreased heel strike. Toe walking on R, R knee in Genu valgus  Increased sets/reps of:  Hip adduction w/ball, Hip abduction in SL  Increased resistance on:  none  Added to Program: weighted knee extension stretch      PROM R knee extension  = lacking 7 deg    See Exercise, Manual, and Modality Logs for complete treatment.     Patient Education: Pt was educated on exercise biomechanical correctness, intensity, and speed.     Assessment:  Yanique had a nice increase in passive knee extension today. She is down to lacking 7 deg as compared to lacking 18 at her initial evaluation.  Did well with her exercises today and counted reps/sets well and technique required minimal cuing. Pt will continue to benefit from skilled PT interventions to address current functional deficits and impairments.     Plan Goals: SHORT TERM GOALS: Time for Goal Achievement: 3 weeks    1.  Patient to be compliant with HEP.   2.  Patient will be able to walk on level surfaces with walker with a normal gait pattern  3.  Patient will have pain levels <5/10 with normal ADL's  4.  Patient will have less than 10* extensor lag     LONG TERM GOALS: Time for Goal Achievement: 6 weeks  1.  Patient will be independent in performing current HEP and understand progress ion of the  program  2.  Patient will have pain free functional knee AROM  3.  Patient will be able comfortable with knee exercises to allow for successful prehab   4.  Pt to exhibit LE endurance/strength to 4+/5 to allow for walking, steps and transfers to occur safely      Plan: Progress to/Continue with current program.       Timed:  Manual Therapy:            10_    mins  99841;  Ultrasound:                     0      mins  18754;   Therapeutic Exercise:    30     mins  84359;     Neuromuscular Mikael:   10     mins  42521;    Therapeutic Activity:      0     mins  44220;      Iontophoresis              _0__   mins  Dry Needling               _0____   mins         Untimed:  Work Conditioning: __0__ mins 01918  Electrical Stimulation:    0    mins  18197 ( );  Mechanical Traction:       0        mins  84740;   Paraffin                       __0___  mins   Ice/Heat: __0__ mins      Timed Treatment:   50   mins   Total Treatment:     50   mins          Yamilex Riley PTA  [place] License # [id number]  Physical Therapist Assistant

## 2025-01-26 NOTE — PROGRESS NOTES
Physical Therapy Daily Treatment Note  1/27/2025  Visit Diagnoses:    ICD-10-CM ICD-9-CM   1. Primary osteoarthritis of right knee  M17.11 715.16   2. Chronic pain of right knee  M25.561 719.46    G89.29 338.29   3. Impaired functional mobility and activity tolerance  Z74.09 V49.89       VISIT#: 8      Regina Chavez reports: Her knee is feeling tight today. She is not having any pain. Does not think she has had any improvement.  Current Pain Level:    0/10  Affected Area:  stiffness in theR knee      Functional Deficits/Irritating Factors: ambulation, stairs, prolonged positioning and lifting      Compliance with HEP Reported: Yes     Objective  Presents: ambulating limited knee extension on R. Decreased heel strike. Toe walking on R, R knee in Genu valgus  Increased sets/reps of:  step ups   Increased resistance on:  none  Added to Program: Hip flexor stretch      PROM R knee extension = lacking 8 deg      See Exercise, Manual, and Modality Logs for complete treatment.     Patient Education: Pt was educated on exercise biomechanical correctness, intensity, and speed.     Assessment:  Johns knee needs improvement in range of motion and strength. No pain. A couple of STGs met.  Pt will continue to benefit from skilled PT interventions to address current functional deficits and impairments.     Plan Goals: SHORT TERM GOALS: Time for Goal Achievement: 3 weeks    1.  Patient to be compliant with HEP. - MET  2.  Patient will be able to walk on level surfaces with walker with a normal gait pattern  3.  Patient will have pain levels <5/10 with normal ADL's - MET  4.  Patient will have less than 10* extensor lag     LONG TERM GOALS: Time for Goal Achievement: 6 weeks  1.  Patient will be independent in performing current HEP and understand progress ion of the program  2.  Patient will have pain free functional knee AROM  3.  Patient will be able comfortable with knee exercises to allow for successful prehab   4.  Pt to  exhibit LE endurance/strength to 4+/5 to allow for walking, steps and transfers to occur safely       Plan: Progress to/Continue with current program.       Timed:  Manual Therapy:            0_    mins  45674;  Ultrasound:                     0      mins  05989;   Therapeutic Exercise:    30     mins  24499;     Neuromuscular Mikael:   10     mins  43461;    Therapeutic Activity:      0     mins  43033;      Iontophoresis              _0__   mins  Dry Needling               _0____   mins         Untimed:  Work Conditioning: __0__ mins 41427  Electrical Stimulation:    0    mins  65583 ( );  Mechanical Traction:       0        mins  38661;   Paraffin                       __0___  mins   Ice/Heat: __0__ mins      Timed Treatment:   40   mins   Total Treatment:     40   mins          MEIR Garcia License # [id number]  Physical Therapist Assistant

## 2025-01-27 ENCOUNTER — TREATMENT (OUTPATIENT)
Dept: PHYSICAL THERAPY | Facility: CLINIC | Age: 75
End: 2025-01-27
Payer: MEDICARE

## 2025-01-27 DIAGNOSIS — Z74.09 IMPAIRED FUNCTIONAL MOBILITY AND ACTIVITY TOLERANCE: ICD-10-CM

## 2025-01-27 DIAGNOSIS — M17.11 PRIMARY OSTEOARTHRITIS OF RIGHT KNEE: Primary | ICD-10-CM

## 2025-01-27 DIAGNOSIS — G89.29 CHRONIC PAIN OF RIGHT KNEE: ICD-10-CM

## 2025-01-27 DIAGNOSIS — M25.561 CHRONIC PAIN OF RIGHT KNEE: ICD-10-CM

## 2025-01-27 PROCEDURE — 97110 THERAPEUTIC EXERCISES: CPT | Performed by: PHYSICAL THERAPIST

## 2025-01-27 PROCEDURE — 97112 NEUROMUSCULAR REEDUCATION: CPT | Performed by: PHYSICAL THERAPIST

## 2025-01-28 NOTE — PROGRESS NOTES
Physical Therapy Daily Treatment Note  1/29/2025  Visit Diagnoses:    ICD-10-CM ICD-9-CM   1. Primary osteoarthritis of right knee  M17.11 715.16   2. Chronic pain of right knee  M25.561 719.46    G89.29 338.29   3. Impaired functional mobility and activity tolerance  Z74.09 V49.89       VISIT#: 9      Regina Chavez reports: Her knee is about the same. She is not hurting. Things just feel stiff.   Current Pain Level:    0/10  Affected Area:  stiffness in the R knee      Functional Deficits/Irritating Factors: ambulation, stairs, prolonged positioning and lifting        Objective  Presents: ambulating limited knee extension on R. Decreased heel strike. Toe walking on R, R knee in Genu valgus     Added to Program: SL clam, lateral step up      PROM R knee extension = lacking 7 deg     See Exercise, Manual, and Modality Logs for complete treatment.     Patient Education: Pt was educated on exercise biomechanical correctness, intensity, and speed. Encouraged not to wear large knee pads in her home but when outside and in the community for confidence. They keep her knee into flexion pattern. Pt fearful of falling again.     Assessment:  No significant change in Yanique's range of motion. She is gaining more strength and more aware of extending her knee with functional movements. She verbalized understanding of limiting use of knee pads while at home so she is better able to get improved knee extension.  Pt will continue to benefit from skilled PT interventions to address current functional deficits and impairments.     Plan Goals: SHORT TERM GOALS: Time for Goal Achievement: 3 weeks    1.  Patient to be compliant with HEP. - MET  2.  Patient will be able to walk on level surfaces with walker with a normal gait pattern partially met  3.  Patient will have pain levels <5/10 with normal ADL's - MET  4.  Patient will have less than 10* extensor lag     LONG TERM GOALS: Time for Goal Achievement: 6 weeks  1.  Patient will be  independent in performing current HEP and understand progress ion of the program  2.  Patient will have pain free functional knee AROM - MET  3.  Patient will be able comfortable with knee exercises to allow for successful prehab   4.  Pt to exhibit LE endurance/strength to 4+/5 to allow for walking, steps and transfers to occur safely        Plan: Progress to/Continue with current program.       Timed:  Manual Therapy:            10_    mins  18692;  Ultrasound:                     0      mins  32403;   Therapeutic Exercise:    35     mins  37505;     Neuromuscular Mikael:   0     mins  16902;    Therapeutic Activity:      10     mins  90075;      Iontophoresis              _0__   mins  Dry Needling               _0____   mins         Untimed:  Work Conditioning: __0__ mins 87576  Electrical Stimulation:    0    mins  04681 ( );  Mechanical Traction:       0        mins  55867;   Paraffin                       __0___  mins   Ice/Heat: __0__ mins      Timed Treatment:   55   mins   Total Treatment:     55   mins          Yamilex Riley PTA  [place] License # [id number]  Physical Therapist Assistant

## 2025-01-29 ENCOUNTER — TREATMENT (OUTPATIENT)
Dept: PHYSICAL THERAPY | Facility: CLINIC | Age: 75
End: 2025-01-29
Payer: MEDICARE

## 2025-01-29 DIAGNOSIS — M17.11 PRIMARY OSTEOARTHRITIS OF RIGHT KNEE: Primary | ICD-10-CM

## 2025-01-29 DIAGNOSIS — G89.29 CHRONIC PAIN OF RIGHT KNEE: ICD-10-CM

## 2025-01-29 DIAGNOSIS — M25.561 CHRONIC PAIN OF RIGHT KNEE: ICD-10-CM

## 2025-01-29 DIAGNOSIS — Z74.09 IMPAIRED FUNCTIONAL MOBILITY AND ACTIVITY TOLERANCE: ICD-10-CM

## 2025-02-03 ENCOUNTER — TREATMENT (OUTPATIENT)
Dept: PHYSICAL THERAPY | Facility: CLINIC | Age: 75
End: 2025-02-03
Payer: MEDICARE

## 2025-02-03 DIAGNOSIS — M17.11 PRIMARY OSTEOARTHRITIS OF RIGHT KNEE: Primary | ICD-10-CM

## 2025-02-03 DIAGNOSIS — Z74.09 IMPAIRED FUNCTIONAL MOBILITY AND ACTIVITY TOLERANCE: ICD-10-CM

## 2025-02-03 DIAGNOSIS — G89.29 CHRONIC PAIN OF RIGHT KNEE: ICD-10-CM

## 2025-02-03 DIAGNOSIS — M25.561 CHRONIC PAIN OF RIGHT KNEE: ICD-10-CM

## 2025-02-03 PROCEDURE — 97140 MANUAL THERAPY 1/> REGIONS: CPT | Performed by: PHYSICAL THERAPIST

## 2025-02-03 PROCEDURE — 97110 THERAPEUTIC EXERCISES: CPT | Performed by: PHYSICAL THERAPIST

## 2025-02-03 PROCEDURE — 97530 THERAPEUTIC ACTIVITIES: CPT | Performed by: PHYSICAL THERAPIST

## 2025-02-03 PROCEDURE — 97112 NEUROMUSCULAR REEDUCATION: CPT | Performed by: PHYSICAL THERAPIST

## 2025-02-03 NOTE — PROGRESS NOTES
Physical Therapy Daily Treatment Note  [facility] Physical Therapy [facility]  [address], [address]  [place] [address]    Patient: Regina Chavez   : 1950  Referring practitioner: VAISHALI Perez  Today's Date: 2/3/2025  Patient seen for 10 sessions    Visit Diagnoses:    ICD-10-CM ICD-9-CM   1. Primary osteoarthritis of right knee  M17.11 715.16   2. Chronic pain of right knee  M25.561 719.46    G89.29 338.29   3. Impaired functional mobility and activity tolerance  Z74.09 V49.89       Subjective   Regina Chavez reports: that she continues to walk around her house without her walker.  States that the knee still feels very stiff.      Objective   Presents wearing knee pads walking with shuffling flexed knee gait pattern    Heel slide after manual stretch 12* - 120*    After additional manual therapy was 7* - 127*    See Exercise, Manual, and Modality Logs for complete treatment.     Patient Education:    Assessment/Plan  Considerable tightness in the posterior knee capsule.  Needs constant cueing for proper gait pattern and exercise technique.  Still walking with foot flat positioning for heel strike but does have some additional knee extension range available for use.    Progress strengthening /stabilization /functional activity           Timed:  Manual Therapy:    20     mins  73172;  Therapeutic Exercise:    15/25    mins  49141;     Neuromuscular Mikael:    15    mins  40650;    Therapeutic Activity:     10     mins  58515;     Ultrasound:      0     mins  91175;    Iontophoresis              __0_   mins  Dry Needling               _____   mins        Untimed:  Electrical Stimulation:     0    mins  67579 ( );  Mechanical Traction:             mins  08406;   Paraffin                       _____  mins     Timed Treatment:   60   mins   Total Treatment:     75   mins    Gretchen Lara, PT  [place] License # [id number]  Physical Therapist    Electronically signed by Gretchen MCKEON  PARAM Lara, 02/03/25, 11:01 AM EST

## 2025-02-06 ENCOUNTER — TREATMENT (OUTPATIENT)
Dept: PHYSICAL THERAPY | Facility: CLINIC | Age: 75
End: 2025-02-06
Payer: MEDICARE

## 2025-02-06 DIAGNOSIS — G89.29 CHRONIC PAIN OF RIGHT KNEE: ICD-10-CM

## 2025-02-06 DIAGNOSIS — M25.561 CHRONIC PAIN OF RIGHT KNEE: ICD-10-CM

## 2025-02-06 DIAGNOSIS — M17.11 PRIMARY OSTEOARTHRITIS OF RIGHT KNEE: Primary | ICD-10-CM

## 2025-02-06 DIAGNOSIS — Z74.09 IMPAIRED FUNCTIONAL MOBILITY AND ACTIVITY TOLERANCE: ICD-10-CM

## 2025-02-06 NOTE — PROGRESS NOTES
Physical Therapy Daily Treatment Note - Discharge  [facility] Physical Therapy - [facility]  [address]  [place] [address]    Patient: Regina Chavez   : 1950  Referring practitioner: VAISHALI Perez  Today's Date: 2025  Patient seen for 11 sessions    Visit Diagnoses:    ICD-10-CM ICD-9-CM   1. Primary osteoarthritis of right knee  M17.11 715.16   2. Chronic pain of right knee  M25.561 719.46    G89.29 338.29   3. Impaired functional mobility and activity tolerance  Z74.09 V49.89       Subjective   Regina Chavez reports: that she has been riding her bike and using  her cuff weights for home exercises.  States that she does not have any pain but still has the stiffness in her knee.  Reports searing knee pads all of the time when up.      Objective   Heel slide after manual therapy - 8* - 126*    Gait with foot flat position on the right - short stride length     See Exercise, Manual, and Modality Logs for complete treatment.     Patient Education: importance of HEP in preparation for upcoming TKR.  Importance of heel strike with gait    Assessment/Plan  Patient with persisted bent knee gait pattern.  She can correct it somewhat but with the dementia there is not carry over to even a few minutes later.  To have TKR next month.  Patient to continue use of her home bike, cuff weights and walking to prepare for the surgery.  As no true progress has been made this past week or so we will stop therpay until after surgery.    Dc to HEP in preparation for TKR           Timed:  Manual Therapy:    14     mins  85054;  Therapeutic Exercise:    15/25     mins  45453;     Neuromuscular Mikael:    10    mins  09471;    Therapeutic Activity:     0     mins  40098;     Ultrasound:      0     mins  02602;    Iontophoresis              __0_   mins  Dry Needling               _____   mins        Untimed:  Electrical Stimulation:     0    mins  45500 ( );  Mechanical  Traction:             mins  17123;   Paraffin                       _____  mins     Timed Treatment:   39   mins   Total Treatment:     55   mins    Gretchen Lara, PT  [place] License # [id number]  Physical Therapist    Electronically signed by Gretchen Lara PT, 02/06/25, 10:55 AM EST

## 2025-03-03 ENCOUNTER — TELEPHONE (OUTPATIENT)
Dept: ORTHOPEDIC SURGERY | Facility: CLINIC | Age: 75
End: 2025-03-03

## 2025-03-03 NOTE — TELEPHONE ENCOUNTER
"Provider: DR OTT     Caller: Regina Chavez \"TERESA\"    Relationship to Patient: Self    Phone Number: [phone]    Reason for Call: PATIENT HAS DECIDED SHE DOES NOT WANT TO CANCEL HER SURGERY AND WOULD LIKE PROCEED AS SCHEDULED.      "

## 2025-03-03 NOTE — TELEPHONE ENCOUNTER
Health Maintenance Due   Topic Date Due   • DTaP/Tdap/Td Vaccine (1 - Tdap) 02/18/2005   • Shingles Vaccine (2 of 3) 08/26/2014   • Diabetes Eye Exam  06/04/2020   • Colonoscopy Risk  07/28/2021   • Diabetes Foot Exam  08/25/2021   • Diabetes A1C  03/30/2022       Patient is due for topics listed above, she wishes to proceed with Diabetes A1C and Diabetes Foot Exam, but is not proceeding with Immunization(s) Dtap/Tdap/Td and Shingles at this time. The following has occurred: Education provided for Immunization(s) Dtap/Tdap/Td and Shingles.   Noted. No changes maded.

## 2025-03-03 NOTE — TELEPHONE ENCOUNTER
"Provider: NAMRATA    Caller: Regina Chavez \"TERESA\"    Relationship to Patient: Self    Phone Number: [phone]    Reason for Call: PATIENT NEEDS TO CANCEL HER UPCOMING KNEE SURGERY. SHE WILL CALL BACK WHEN READY TO R/S.  "

## 2025-03-05 ENCOUNTER — TELEPHONE (OUTPATIENT)
Dept: ORTHOPEDIC SURGERY | Facility: CLINIC | Age: 75
End: 2025-03-05

## 2025-03-05 ENCOUNTER — TELEPHONE (OUTPATIENT)
Dept: ORTHOPEDIC SURGERY | Facility: CLINIC | Age: 75
End: 2025-03-05
Payer: MEDICARE

## 2025-03-05 NOTE — TELEPHONE ENCOUNTER
I attempted to contact the patient to see if I can help clarify if she has any questions or concerns about the surgery or why she is hesitant about wanting to reschedule and then saying no she wants to keep her surgery date.  Patient did not answer therefore I left her a voicemail instructing her that I was reaching out to see if I can clarify any knowledge gaps that she might have.  I instructed her to give me a call back at the office if needed.

## 2025-03-05 NOTE — TELEPHONE ENCOUNTER
"  Caller: Regina Chavez \"TERESA\"    Relationship: Self    Best call back number: [phone]      What was the call regarding: PT CALLING TO CONFIRM DETAILS PREADMIN TESTING ON 04/21/25. PT NEEDS TO ALSO SET UP PREOP AND POSTOP APPTS IN RELATION TO PT'S SX WITH DR OTT ON 05/05/25. PLEASE CONTACT PT TO DISCUSS.     "

## 2025-03-05 NOTE — TELEPHONE ENCOUNTER
"    Caller: Regina Chavez \"TERESA\"    Relationship to Patient: Self    Phone Number: [phone]    Reason for Call: PATIENT STATES SHE WANTS TO KEEP HER SURGERY SCHEDULED AS IT IS NOW. SHE DOESN'T WANT TO R/S TO A DIFFERENT DATE.    "

## 2025-03-05 NOTE — TELEPHONE ENCOUNTER
"    Caller: Regina Chavez \"TERESA\"    Relationship to Patient: Self      Phone Number: [phone]     Reason for Call: PT STATES THAT SHE HAS SURGERY SCHEDULED FOR 5. 5.25 - SHE WOULD LIKE TO KNOW  IF SHE NEED TO CONTINUE PHYSICAL THERAPY UNTIL SHE HAS HER SURGERY.    PLEASE CONTACT PT AND ADVISE  direction.   "

## 2025-03-05 NOTE — TELEPHONE ENCOUNTER
"    Caller: Regina Chavez \"TERESA\"    Relationship to patient: Self    Best call back number: [phone]    Chief complaint: RIGHT KNEE ARTHROPLASTY     Type of visit: SURGERY   If rescheduling, when is the original appointment: 03/24/2025   "

## 2025-03-19 ENCOUNTER — TELEPHONE (OUTPATIENT)
Dept: ORTHOPEDIC SURGERY | Facility: CLINIC | Age: 75
End: 2025-03-19
Payer: MEDICARE

## 2025-03-19 NOTE — TELEPHONE ENCOUNTER
Patient left message on scheduling voicemail stating she wanted to reschedule her 3/24/25 surgery. She had already rescheduled this surgery to 5/5/25, but did not recall. I provided her with those details and the PAT and pre-op appointment information again. I also directed her to the surgery packet that was mailed to her last week.

## 2025-04-21 ENCOUNTER — PRE-ADMISSION TESTING (OUTPATIENT)
Dept: PREADMISSION TESTING | Facility: HOSPITAL | Age: 75
End: 2025-04-21
Payer: MEDICARE

## 2025-04-21 VITALS
RESPIRATION RATE: 18 BRPM | HEIGHT: 69 IN | BODY MASS INDEX: 22.41 KG/M2 | WEIGHT: 151.3 LBS | TEMPERATURE: 97.6 F | HEART RATE: 58 BPM | DIASTOLIC BLOOD PRESSURE: 81 MMHG | SYSTOLIC BLOOD PRESSURE: 111 MMHG | OXYGEN SATURATION: 97 %

## 2025-04-21 LAB
ANION GAP SERPL CALCULATED.3IONS-SCNC: 9 MMOL/L (ref 5–15)
BUN SERPL-MCNC: 25 MG/DL (ref 8–23)
BUN/CREAT SERPL: 32.9 (ref 7–25)
CALCIUM SPEC-SCNC: 9.7 MG/DL (ref 8.6–10.5)
CHLORIDE SERPL-SCNC: 107 MMOL/L (ref 98–107)
CO2 SERPL-SCNC: 22 MMOL/L (ref 22–29)
CREAT SERPL-MCNC: 0.76 MG/DL (ref 0.57–1)
DEPRECATED RDW RBC AUTO: 40.5 FL (ref 37–54)
EGFRCR SERPLBLD CKD-EPI 2021: 80.8 ML/MIN/1.73
ERYTHROCYTE [DISTWIDTH] IN BLOOD BY AUTOMATED COUNT: 11.9 % (ref 12.3–15.4)
GLUCOSE SERPL-MCNC: 94 MG/DL (ref 65–99)
HCT VFR BLD AUTO: 39 % (ref 34–46.6)
HGB BLD-MCNC: 13.3 G/DL (ref 12–15.9)
MCH RBC QN AUTO: 31.7 PG (ref 26.6–33)
MCHC RBC AUTO-ENTMCNC: 34.1 G/DL (ref 31.5–35.7)
MCV RBC AUTO: 92.9 FL (ref 79–97)
PLATELET # BLD AUTO: 247 10*3/MM3 (ref 140–450)
PMV BLD AUTO: 9.9 FL (ref 6–12)
POTASSIUM SERPL-SCNC: 3.6 MMOL/L (ref 3.5–5.2)
QT INTERVAL: 463 MS
QTC INTERVAL: 432 MS
RBC # BLD AUTO: 4.2 10*6/MM3 (ref 3.77–5.28)
SODIUM SERPL-SCNC: 138 MMOL/L (ref 136–145)
WBC NRBC COR # BLD AUTO: 5.5 10*3/MM3 (ref 3.4–10.8)

## 2025-04-21 PROCEDURE — 85027 COMPLETE CBC AUTOMATED: CPT

## 2025-04-21 PROCEDURE — 80048 BASIC METABOLIC PNL TOTAL CA: CPT

## 2025-04-21 PROCEDURE — 93005 ELECTROCARDIOGRAM TRACING: CPT

## 2025-04-21 PROCEDURE — 93010 ELECTROCARDIOGRAM REPORT: CPT | Performed by: INTERNAL MEDICINE

## 2025-04-21 PROCEDURE — 36415 COLL VENOUS BLD VENIPUNCTURE: CPT

## 2025-04-21 NOTE — DISCHARGE INSTRUCTIONS
Take the following medications the morning of surgery:  NONE      If you are on prescription narcotic pain medication to control your pain you may also take that medication the morning of surgery.      General Instructions:     Do not eat solid food after midnight the night before surgery.  Clear liquids day of surgery are allowed but must be stopped at least two hours before your hospital arrival time.       Allowed clear liquids      Water, sodas, and tea or coffee with no cream or milk added.       12 to 20 ounces of a clear liquid that contains carbohydrates is recommended.  If non-diabetic, have Gatorade or Powerade.  If diabetic, have G2 or Powerade Zero.     Do not have liquids red in color.  Do not consume chicken, beef, pork or vegetable broth or bouillon cubes of any variety as they are not considered clear liquids and are not allowed.      Infants may have breast milk up to four hours before surgery.  Infants drinking formula may drink formula up to six hours before surgery.   Patients who avoid smoking, chewing tobacco and alcohol for 4 weeks prior to surgery have a reduced risk of post-operative complications.  Quit smoking as many days before surgery as you can.  Do not smoke, use chewing tobacco or drink alcohol the day of surgery.   If applicable bring your C-PAP/ BI-PAP machine in with you to preop day of surgery.  Bring any papers given to you in the doctor’s office.  Wear clean comfortable clothes.  Do not wear contact lenses, false eyelashes or make-up.  Bring a case for your glasses.   Bring crutches or walker if applicable.  Remove all piercings.  Leave jewelry and any other valuables at home.  Hair extensions with metal clips must be removed prior to surgery.  The Pre-Admission Testing nurse will instruct you to bring medications if unable to obtain an accurate list in Pre-Admission Testing.    Day of surgery you will need to let the preoperative nurse know the last time you took each of your  medications.  To ensure a safe environment for patients and staff, we kindly ask that children under the age of 16 not accompany patients.  If you must bring a dependent child or dependent adult please ensure a responsible adult, other than yourself, is present to supervise them.      If you were given a blood bank ID arm band remember to bring it with you the day of surgery.    Preventing a Surgical Site Infection:  For 2 to 3 days before surgery, avoid shaving with a razor because the razor can irritate skin and make it easier to develop an infection.    Any areas of open skin can increase the risk of a post-operative wound infection by allowing bacteria to enter and travel throughout the body.  Notify your surgeon if you have any skin wounds / rashes even if it is not near the expected surgical site.  The area will need assessed to determine if surgery should be delayed until it is healed.  The night prior to surgery shower using a fresh bar of anti-bacterial soap (such as Dial) and clean washcloth.  Sleep in a clean bed with clean clothing.  Do not allow pets to sleep with you.  Shower on the morning of surgery using a fresh bar of anti-bacterial soap (such as Dial) and clean washcloth.  Dry with a clean towel and dress in clean clothing.  Ask your surgeon if you will be receiving antibiotics prior to surgery.  Make sure you, your family, and all healthcare providers clean their hands with soap and water or an alcohol based hand  before caring for you or your wound.    Day of surgery:  Your arrival time is approximately two hours before your scheduled surgery time.  Please note if you have an early arrival time the surgery doors do not open before 5:00 AM.  Upon arrival, a Pre-op nurse and Anesthesiologist will review your health history, obtain vital signs, and answer questions you may have.  The only belongings needed at this time will be a list of your home medications and if applicable your  C-PAP/BI-PAP machine.  A Pre-op nurse will start an IV and you may receive medication in preparation for surgery, including something to help you relax.     Please be aware that surgery does come with discomfort.  We want to make every effort to control your discomfort so please discuss any uncontrolled symptoms with your nurse.   Your doctor will most likely have prescribed pain medications.      If you are going home after surgery you will receive individualized written care instructions before being discharged.  A responsible adult must drive you to and from the hospital on the day of your surgery and ideally stay with you through the night.   .  Discharge prescriptions can be filled by the hospital pharmacy during regular pharmacy hours.  If you are having surgery late in the day/evening your prescription may be e-prescribed to your pharmacy.  Please verify your pharmacy hours or chose a 24 hour pharmacy to avoid not having access to your prescription because your pharmacy has closed for the day.    If you are staying overnight following surgery, you will be transported to your hospital room following the recovery period.  [facility] has all private rooms.    If you have any questions please call Pre-Admission Testing at [phone].  Deductibles and co-payments are collected on the day of service. Please be prepared to pay the required co-pay, deductible or deposit on the day of service as defined by your plan.    Call your surgeon immediately if you experience any of the following symptoms:  Sore Throat  Shortness of Breath or difficulty breathing  Cough  Chills  Body soreness or muscle pain  Headache  Fever  New loss of taste or smell  Do not arrive for your surgery ill.  Your procedure will need to be rescheduled to another time.  You will need to call your physician before the day of surgery to avoid any unnecessary exposure to hospital staff as well as other patients.           CHLORHEXIDINE CLOTH INSTRUCTIONS  The morning of surgery follow these instructions using the Chlorhexidine cloths you've been given.  These steps reduce bacteria on the body.  Do not use the cloths near your eyes, ears mouth, genitalia or on open wounds.  Throw the cloths away after use but do not try to flush them down a toilet.      Open and remove one cloth at a time from the package.    Leave the cloth unfolded and begin the bathing.  Massage the skin with the cloths using gentle pressure to remove bacteria.  Do not scrub harshly.   Follow the steps below with one 2% CHG cloth per area (6 total cloths).  One cloth for neck, shoulders and chest.  One cloth for both arms, hands, fingers and underarms (do underarms last).  One cloth for the abdomen followed by groin.  One cloth for right leg and foot including between the toes.  One cloth for left leg and foot including between the toes.  The last cloth is to be used for the back of the neck, back and buttocks.    Allow the CHG to air dry 3 minutes on the skin which will give it time to work and decrease the chance of irritation.  The skin may feel sticky until it is dry.  Do not rinse with water or any other liquid or you will lose the beneficial effects of the CHG.  If mild skin irritation occurs, do rinse the skin to remove the CHG.  Report this to the nurse at time of admission.  Do not apply lotions, creams, ointments, deodorants or perfumes after using the clothes. Dress in clean clothes before coming to the hospital.

## 2025-04-25 ENCOUNTER — TELEPHONE (OUTPATIENT)
Dept: ORTHOPEDIC SURGERY | Facility: CLINIC | Age: 75
End: 2025-04-25

## 2025-04-25 NOTE — TELEPHONE ENCOUNTER
"    Caller: Regina Chavez \"TERESA\"    Relationship to patient: Self    Best call back number:     Chief complaint: H&P RT KNEE // SX DATE 5/5/25     Type of visit: PRE-OP    Requested date: ANY DAY NEXT WEEK, PREFERABLY LATER IN THE MORNING OR AFTERNOON    If rescheduling, when is the original appointment: 4/29/25 AT 8:50 AM       "

## 2025-04-25 NOTE — TELEPHONE ENCOUNTER
R/s to 5/1/25 @ 2:00   High Dose Vitamin A Counseling: Side effects reviewed, pt to contact office should one occur.

## 2025-05-01 ENCOUNTER — TELEPHONE (OUTPATIENT)
Dept: ORTHOPEDIC SURGERY | Facility: CLINIC | Age: 75
End: 2025-05-01

## 2025-05-01 ENCOUNTER — OFFICE VISIT (OUTPATIENT)
Dept: ORTHOPEDIC SURGERY | Facility: CLINIC | Age: 75
End: 2025-05-01
Payer: MEDICARE

## 2025-05-01 VITALS
OXYGEN SATURATION: 97 % | DIASTOLIC BLOOD PRESSURE: 74 MMHG | WEIGHT: 149.1 LBS | BODY MASS INDEX: 28.15 KG/M2 | HEART RATE: 63 BPM | SYSTOLIC BLOOD PRESSURE: 104 MMHG | TEMPERATURE: 98.2 F | HEIGHT: 61 IN

## 2025-05-01 DIAGNOSIS — R52 PAIN: Primary | ICD-10-CM

## 2025-05-01 NOTE — TELEPHONE ENCOUNTER
"  Caller: Regina Chavez \"TERESA\"    Relationship: Self    Best call back number: [phone] (home)     Who are you requesting to speak with (clinical staff, provider,  specific staff member):     What was the call regarding: MS CHAVEZ HAS SOME QUESTIONS ABOUT HER UPCOMING SURGERY THAT SHE WOULD LIKE TO SPEAK WITH DR OTT ABOUT    Is it okay if the provider responds through MyChart: CALL  "

## 2025-05-01 NOTE — H&P
Patient: Regina Chavez    Date of Admission: 5/5/2025    YOB: 1950    Medical Record Number: [id number]    Admitting Physician: Dr. Nathanael Shahid    Reason for Admission: End Stage Right Knee OA    History of Present Illness: 75 y.o. female presents with severe end stage knee osteoarthritis which has not been responsive to the full compliment of conservative measures. Despite conservative attempts, there is still severe, constant activity-limiting pain. Given the severity of the pain, the patient has elected to proceed with knee replacement.    Allergies: No Known Allergies      Current Medications:  Home Medications:    Current Outpatient Medications on File Prior to Visit   Medication Sig    Cholecalciferol (VITAMIN D3) 125 MCG (5000 UT) capsule capsule Take 1 capsule by mouth Daily. HOLDING FOR DOS    donepezil (ARICEPT) 10 MG tablet Take 1 tablet by mouth Every Night.    losartan-hydrochlorothiazide (HYZAAR) 50-12.5 MG per tablet Take 1 tablet by mouth Daily.    melatonin 5 MG tablet tablet 1 tablet At Night As Needed.    meloxicam (MOBIC) 15 MG tablet Take 1 tablet by mouth Daily. FOLLOW MD GUIDELINES ON HOLDING FOR DOS    mirtazapine (REMERON SOL-TAB) 15 MG disintegrating tablet Place 1 tablet on the tongue Every Night.    Multiple Vitamins-Minerals (WOMENS 50+ MULTI VITAMIN/MIN PO) Take 1 tablet by mouth Daily. HOLDING FOR DOS    traZODone (DESYREL) 50 MG tablet Take 1 tablet by mouth Every Night.    raloxifene (EVISTA) 60 MG tablet Take 1 tablet by mouth Daily.     No current facility-administered medications on file prior to visit.     PRN Meds:.    PMH:     Past Medical History:   Diagnosis Date    Aneurysm 03/19/2019    AORTIC, CHECK YEARLY    Anxiety     Aortic aneurysm     ASCENDING, STABLE 4.1CM    Arthritis of knee     OSTEOARTHRITIS    Brain concussion 2013    Clostridium difficile infection 2024    CALLED SONAL IN     Dementia     Depression     GERD (gastroesophageal reflux  disease)     H/O arthroscopy of right knee     Heme + stool     Hiatal hernia     History of alcohol abuse     History of drug abuse     Hyperlipidemia 2017    Hypertension     Knee swelling     Low back pain 2018    Lumbosacral disc disease 2018    MRSA (methicillin resistant Staphylococcus aureus) 2013    Osteopenia     Pulmonary embolism     Sleep apnea     NO DEVICE    Stress fracture     Subluxation of patella     Tear of meniscus of knee     Tendinitis of knee     Urinary tract infection        PF/Surg/Soc Hx:     Past Surgical History:   Procedure Laterality Date    APPENDECTOMY      BACK SURGERY  18 KYPHOPLASTY, VERTEBROPLASTY    BREAST IMPLANT SURGERY      CARDIAC CATHETERIZATION       SECTION      X2    CHOLECYSTECTOMY      COLONOSCOPY      FEMUR SURGERY Right 2024    FROM FALL    KNEE SURGERY Right     KYPHOPLASTY Bilateral 10/01/2018    Procedure: KYPHOPLASTY WITH POSSIBLE BIOPSY, THORACIC 12;  Surgeon: Orlin Warren IV, MD;  Location: [facility] ;  Service: Neurosurgery        Social History     Occupational History    Occupation: Retired   Tobacco Use    Smoking status: Never     Passive exposure: Never    Smokeless tobacco: Never   Vaping Use    Vaping status: Never Used   Substance and Sexual Activity    Alcohol use: Not Currently     Comment: FOR SLEEP    Drug use: Not Currently     Frequency: 5.0 times per week     Types: Benzodiazepines, Other     Comment: FOR SLEEP    Sexual activity: Not Currently     Partners: Male     Birth control/protection: None      Social History     Social History Narrative    Not on file        Family History   Problem Relation Age of Onset    Hip fracture Mother     Hypertension Mother     Heart failure Mother     Osteoporosis Mother     Broken bones Mother     Anxiety disorder Mother     Arthritis Mother         OSTEOARTHRITIS    Heart failure Father     Hypertension Father     Hyperlipidemia Father     Malig Hyperthermia Neg Hx  "         Review of Systems:   A 14 point review of systems was performed, pertinent positives discussed above, all other systems are negative    Physical Exam: 75 y.o. female  Vital Signs :   Vitals:    05/01/25 1400   BP: 104/74   Pulse: 63   Temp: 98.2 °F (36.8 °C)   SpO2: 97%   Weight: 67.6 kg (149 lb 1.6 oz)   Height: 154.9 cm (61\")     General: Alert and Oriented x 3, No acute distress.  Psych: mood and affect appropriate; recent and remote memory intact  Eyes: conjunctivae clear; pupils equally round and reactive, sclerae anicteric  CV: RRR  Resp: normal respiratory effort  Skin: no rashes or wounds; normal turgor      Xrays:  -3 views (AP, lateral, and sunrise) were reviewed demonstrating end-stage OA with bone on bone articulation.  -A full length AP xray was ordered and reviewed today for purposes of operative alignment demonstrating end stage arthritic findings. There are no previous full length films for review    Assessment:  End-stage Right knee osteoarthritis. Conservative measures have failed.      Plan:  The plan is to proceed with Right Total Knee Replacement. The patient voiced understanding of the risks, benefits, and alternative forms of treatment that were discussed with Dr Shahid at the time of scheduling.  23-hour home health, history of PE, will prescribe Eliquis postoperatively for 6 weeks    Claritza Smith, APRN  5/1/2025         "

## 2025-05-01 NOTE — TELEPHONE ENCOUNTER
"  Caller: Regina Chavez \"TERESA\"    Relationship: Self    Best call back number: [phone]    What was the call regarding: PT CALLING TO ASK A FEW QUESTIONS REGARDING KNEE REPLACEMENT WITH DR OTT. PT WOULD LIKE TO SPEAK WITH DR OTT IF POSSIBLE. PLEASE CONTACT PT TO DISCUSS.     "

## 2025-05-02 ENCOUNTER — TELEPHONE (OUTPATIENT)
Dept: ORTHOPEDIC SURGERY | Facility: CLINIC | Age: 75
End: 2025-05-02
Payer: MEDICARE

## 2025-05-02 NOTE — TELEPHONE ENCOUNTER
"Hub staff attempted to follow warm transfer process and was unsuccessful     Caller: Regina Chavez \"TERESA\"    Relationship to patient: Self    Best call back number:     Patient is needing: PATIENT CALLED STATING SHE WANTS TO DO MORE PHYSICAL THERAPY AND WANTS TO PUSH SURGERY BACK.  PLEASE CONTACT   "

## 2025-05-02 NOTE — TELEPHONE ENCOUNTER
Patient called wanting to talk to Dr. Shahid ahead of surgery. She is scheduled for sx 5/5/25. She did not want to discuss with me and specifically kept asking to talk to Dr. Shahid. Patient has repeatedly changed her mind about this procedure, having called to cancel or reschedule numerous times, but never willing to provide more information. She saw Claritza Smith yesterday, 5/1/25, for H&P.

## 2025-05-05 ENCOUNTER — TELEPHONE (OUTPATIENT)
Dept: ORTHOPEDIC SURGERY | Facility: CLINIC | Age: 75
End: 2025-05-05

## 2025-05-05 NOTE — TELEPHONE ENCOUNTER
PT CALLED TO [facility]L SX ADVISED IT WAS CXLD BUT WANTS DR OTT TO KNOW HER BLOOD PRESSURE IS REALLY HIGH TODAY

## 2025-05-05 NOTE — TELEPHONE ENCOUNTER
Spoke to patient's , Immanuel. Relayed message from Dr. Shahid about coming in office to discuss options. Confirmed 5/20 @ 10:50 appt.

## 2025-05-05 NOTE — TELEPHONE ENCOUNTER
I spoke to patient's , Immanuel, who called around the same time at patient this morning. There is an additional telephone encounter for that call. He was unaware patient had cancelled surgery until last night and reported patient has dementia and has a recent history of cancelling appointments and surgery. Communication needs to be done through . Per Dr. Shahid, I am working on scheduling an in office appointment for patient w/  present.

## 2025-05-05 NOTE — TELEPHONE ENCOUNTER
Provider: NAMRATA    Caller: Immanuel Chavez    Relationship to Patient: Emergency Contact      Phone Number: [phone]      Reason for Call: TO SPEAK WITH SURGERY SCHEDULER; PATIENT HAS DEMENTIA AND CXLD HER SX

## 2025-05-20 ENCOUNTER — OFFICE VISIT (OUTPATIENT)
Dept: ORTHOPEDIC SURGERY | Facility: CLINIC | Age: 75
End: 2025-05-20
Payer: MEDICARE

## 2025-05-20 VITALS — WEIGHT: 149 LBS | TEMPERATURE: 98.2 F | HEIGHT: 61 IN | BODY MASS INDEX: 28.13 KG/M2

## 2025-05-20 DIAGNOSIS — M17.11 PRIMARY OSTEOARTHRITIS OF RIGHT KNEE: Primary | ICD-10-CM

## 2025-05-20 PROCEDURE — 99213 OFFICE O/P EST LOW 20 MIN: CPT | Performed by: ORTHOPAEDIC SURGERY

## 2025-05-20 PROCEDURE — 1159F MED LIST DOCD IN RCRD: CPT | Performed by: ORTHOPAEDIC SURGERY

## 2025-05-20 PROCEDURE — 1160F RVW MEDS BY RX/DR IN RCRD: CPT | Performed by: ORTHOPAEDIC SURGERY

## 2025-05-20 NOTE — PROGRESS NOTES
"Patient: Regina Chavez  YOB: 1950 75 y.o. female  Medical Record Number: [id number]    Chief Complaints:   Chief Complaint   Patient presents with    Right Knee - Follow-up       History of Present Illness:Regina Chavez is a 75 y.o. female who presents for follow-up of right knee she has some feelings of stiffness but does not have pain in the knee she has known advanced arthritis.    Allergies: No Known Allergies    Medications:   Current Outpatient Medications   Medication Sig Dispense Refill    Cholecalciferol (VITAMIN D3) 125 MCG (5000 UT) capsule capsule Take 1 capsule by mouth Daily. HOLDING FOR DOS      donepezil (ARICEPT) 10 MG tablet Take 1 tablet by mouth Every Night.      losartan-hydrochlorothiazide (HYZAAR) 50-12.5 MG per tablet Take 1 tablet by mouth Daily.      melatonin 5 MG tablet tablet 1 tablet At Night As Needed.      meloxicam (MOBIC) 15 MG tablet Take 1 tablet by mouth Daily. FOLLOW MD GUIDELINES ON HOLDING FOR DOS      mirtazapine (REMERON SOL-TAB) 15 MG disintegrating tablet Place 1 tablet on the tongue Every Night.      Multiple Vitamins-Minerals (WOMENS 50+ MULTI VITAMIN/MIN PO) Take 1 tablet by mouth Daily. HOLDING FOR DOS      raloxifene (EVISTA) 60 MG tablet Take 1 tablet by mouth Daily.      traZODone (DESYREL) 50 MG tablet Take 1 tablet by mouth Every Night. (Patient not taking: Reported on 5/20/2025)       No current facility-administered medications for this visit.         The following portions of the patient's history were reviewed and updated as appropriate: allergies, current medications, past family history, past medical history, past social history, past surgical history and problem list.    Review of Systems:   Pertinent positives/negative listed in HPI above    Physical Exam:   Vitals:    05/20/25 1101   Temp: 98.2 °F (36.8 °C)   TempSrc: Temporal   Weight: 67.6 kg (149 lb)   Height: 154.9 cm (61\")   PainSc: 0-No pain   PainLoc: Knee       General: A " and O x 3, ASA, NAD      Knee Exam List: Knee:  right    ALIGNMENT:     Valgus      GAIT:    Antalgic    SKIN:    No abnormality    RANGE OF MOTION:   10  -  110   DEG    STRENGTH:   4  / 5    LIGAMENTS:    No varus / valgus instability.   Negative  Lachman.    MENISCUS:     Negative   Kevon       DISTAL PULSES:    Paplable    DISTAL SENSATION :   Intact    LYMPHATICS:     No   lymphadenopathy    OTHER:          - Positive   effusion      - Crepitance with ROM     Radiology:  Xrays 3views right knee (ap,lateral, sunrise) taken previously demonstratingadvanced valgus osteoarthritis with bone on bone articulation, subchondral cysts, and periarticular osteophytes      Assessment/Plan:  RIGHT KNEE oa -  STIFFNESS IS HER BIGGEST ISSUE -  WILL SEND TO pt, nsiad GEL, TO prn      There are no diagnoses linked to this encounter.    Nathanael Shahid MD  5/20/2025

## 2025-06-10 ENCOUNTER — TREATMENT (OUTPATIENT)
Dept: PHYSICAL THERAPY | Facility: CLINIC | Age: 75
End: 2025-06-10
Payer: MEDICARE

## 2025-06-10 DIAGNOSIS — M25.661 DECREASED RANGE OF MOTION OF RIGHT KNEE: ICD-10-CM

## 2025-06-10 DIAGNOSIS — Z74.09 IMPAIRED FUNCTIONAL MOBILITY AND ACTIVITY TOLERANCE: ICD-10-CM

## 2025-06-10 DIAGNOSIS — M25.661 KNEE STIFFNESS, RIGHT: Primary | ICD-10-CM

## 2025-06-10 PROCEDURE — 97162 PT EVAL MOD COMPLEX 30 MIN: CPT | Performed by: PHYSICAL THERAPIST

## 2025-06-10 PROCEDURE — 97140 MANUAL THERAPY 1/> REGIONS: CPT | Performed by: PHYSICAL THERAPIST

## 2025-06-10 PROCEDURE — 97530 THERAPEUTIC ACTIVITIES: CPT | Performed by: PHYSICAL THERAPIST

## 2025-06-10 PROCEDURE — 97110 THERAPEUTIC EXERCISES: CPT | Performed by: PHYSICAL THERAPIST

## 2025-06-10 NOTE — PROGRESS NOTES
Physical Therapy Initial Evaluation and Plan of Care  [facility] Physical Therapy [facility]  [address], [address]  [place] [address]    Patient: Regina Chavez   : 1950  Diagnosis/ICD-10 Code:  Knee stiffness, right [M25.661]  Referring practitioner: Nathanael Shahid MD  Today's Date: 6/10/2025    Subjective Evaluation    History of Present Illness  Mechanism of injury: Chronic stiffness in right knee for multiple years  Saw Dr Shahid - xrays revealed end stage OA but has little to no pain in the knee  Initially was seen for prehab with planned TKR this month  As her knee pain is miinimal, the surgery was cancelled and she was referred for additional PT  Has not been doing her home exercises prescribed on initial round of PT  Walks around the house about an hour a day.  Uses weight machines for knee extension flexion     Pain  Current pain ratin  At best pain ratin  At worst pain ratin  Location: stiffness in right>left knee, feels more stiffness in anterior than posterior knee  Quality: tight and pressure  Relieving factors: rest  Exacerbated by: no noted activiites that really both the knee.  Progression: no change    Social Support  Lives in: multiple-level home  Lives with: spouse    Diagnostic Tests  X-ray: abnormal (end stage OA)    Treatments  Previous treatment: physical therapy  Current treatment: physical therapy  Patient Goals  Patient goal: get rid of the stiffness         Objective          Observations     Additional Knee Observation Details  Walks with short stride length, valgus position of right knee, does not achieve full knee extension with heel strike     Palpation     Right Tenderness of the vastus medialis.     Tenderness   Left Knee   No tenderness in the popliteal fossa.     Right Knee   Tenderness in the MCL (distal), MCL (proximal), medial joint line and popliteal fossa.     Additional Tenderness Details  Palpable popping with right knee  extension, grinding with left knee extension ,     Neurological Testing     Sensation     Knee   Left Knee   Intact: Light touch    Right Knee   Intact: light touch     Active Range of Motion   Left Knee   Flexion: 143 degrees   Extension: 3 degrees   Extensor la degrees     Right Knee   Flexion: 130 degrees   Extension: 2 degrees   Extensor la degrees     Strength/Myotome Testing     Left Knee   Flexion: 4+  Extension: 4+  Quadriceps contraction: good    Right Knee   Flexion: 4+  Extension: 4  Quadriceps contraction: good    Tests     Left Knee   Negative anterior Lachman, posterior drawer, valgus stress test at 0 degrees and varus stress test at 0 degrees.     Right Knee   Negative anterior Lachman, lateral Kevon, medial Kevon, posterior drawer, valgus stress test at 0 degrees and varus stress test at 0 degrees.     Left Knee Flexibility Comments:   Mild hamstring and hip ER tightness bilatearlly          Assessment & Plan       Assessment  Impairments: abnormal gait, abnormal or restricted ROM, activity intolerance, impaired balance, impaired physical strength, lacks appropriate home exercise program, safety issue and weight-bearing intolerance   Functional limitations: carrying objects, walking, pushing, uncomfortable because of pain, standing, stooping and unable to perform repetitive tasks   Assessment details: Pt is 76 y/o female presenting with:  end stage OA in right>left knees  Pt exhibits the following limitations: 1. No Pain in either knee, 2. Decreased knee ROM, especially extension, 3. Decreased LE strength, 4. Tenderness to palpation of right knee anterior medial joint line, 5. Altered gait pattern, 6. Decreased tolerance for normal ADL's to include safe walking at home and in the community    Pt would benefit from skilled therapy in order to address the aforementioned problems and return to prior level of functioning   Barriers to therapy: Dementia  Prognosis: good    Goals  Plan Goals:  SHORT TERM GOALS: Time for Goal Achievement: 3 weeks    1.  Patient to be compliant with HEP.   2.  Patient will be able to walk on level surfaces for 10 minutes with a normal gait pattern  3.  Patient will be able to achieve heel strike with improved knee extension to allow for improved stabilization when walking   4.  Patient will have 50% less difficutly when climbing up/down stairs    LONG TERM GOALS: Time for Goal Achievement: 6 weeks  1.  Patient will be independent in performing current HEP and understand progress ion of the program  2.  Patient will have pain free functional knee AROM  3.  Patient will be able to walk on level surfaces/inclines and up/down stairs with minimal to no increase in symptoms   4.  Pt will be able to walk for light community activities to include [employer]an, restaurant, grocery without loss of balance or increase in symptoms       Plan  Therapy options: will be seen for skilled therapy services  Planned modality interventions: cryotherapy  Planned therapy interventions: balance/weight-bearing training, flexibility, functional ROM exercises, gait training, home exercise program, manual therapy, strengthening, stretching, therapeutic activities, joint mobilization, soft tissue mobilization and neuromuscular re-education  Frequency: 1x week  Duration in visits: 6  Duration in weeks: 6  Treatment plan discussed with: patient  Plan details: Access Code: [email or web address]  URL: [email or web address]  Date: 06/10/2025  Prepared by: Gretchen Lara    Exercises  - Straight Leg Raise with Ankle Weight  - 1 x daily - 7 x weekly - 2 sets - 10 reps - 3 seconds hold  - Sidelying Hip Abduction  - 1 x daily - 7 x weekly - 2 sets - 10 reps - 3 seconds hold  - Side Stepping with Counter Support  - 1 x daily - 7 x weekly - 3 sets - 10 reps        Manual Therapy:    10     mins  23726;  Therapeutic Exercise:    20     mins  38658;     Neuromuscular Mikael:    0    mins  65836;    Therapeutic Activity:     10      mins  01043;     Ultrasound                  __0_  mins  37589  Iontophoresis                 0    mins  53614    Electrical Stimulation     0    mins  70374 ([id number])  Traction                         _0  mins  26374  Dry Needling                 0    Evaluation Time:     20  mins  Timed Treatment:   40   mins   Total Treatment:     65   mins    PT: Gretchen Lara, PT     License Number: [place] [id number]  Electronically signed by Gretchen Lara PT, 06/10/25, 10:24 AM EDT    Certification Period: 6/10/2025 thru 9/7/2025  I certify that the therapy services are furnished while this patient is under my care.  The services outlined above are required by this patient, and will be reviewed every 90 days.         Physician Signature:__________________________________________________    PHYSICIAN: Nathanael Shahid MD      DATE:     Please sign and return via fax to [email or web address] . Thank you, [facility] Physical Therapy.    PT SIGNATURE: Gretchen Lara PT   [place] LICENSE:  [id number]

## 2025-06-17 NOTE — PROGRESS NOTES
Physical Therapy Daily Treatment Note  6/18/2025  Visit Diagnoses:    ICD-10-CM ICD-9-CM   1. Knee stiffness, right  M25.661 719.56   2. Decreased range of motion of right knee  M25.661 719.56   3. Impaired functional mobility and activity tolerance  Z74.09 V49.89       VISIT#: 2      Regina Chavez reports: things are about the same. Her knees are stiff. Not painful but stiff. Stated she is going up and down stairs but not sure of her technique.   Current Pain Level:    0/10; Worst:   0/10; Best:  0/10  Affected Area: stiffness in right>left knee, feels more stiffness in anterior than posterior knee   Quality of Pain: tight and pressure   Functional Deficits/Irritating Factors: carrying objects, walking, pushing, uncomfortable because of pain, standing, stooping and unable to perform repetitive tasks   Progression: no significant changes  Compliance with HEP Reported: no but doing walking and a machine     Objective  Presents: Walks with short stride length, valgus position of right knee, does not achieve full knee extension with heel strike, L knee/hip in ER  Increased sets/reps of:  sidestepping   Increased resistance on:  scifit  Added to Program: piriformis stretch     AROM R knee extension = lacking 9 deg; PROM = lacking 5 deg    See Exercise, Manual, and Modality Logs for complete treatment.     Patient Education: Pt was educated on exercise biomechanical correctness, intensity, and speed.     Assessment:  Yanique does not relate any improvements in her knee stiffness at this time but it is early in her rehab. Her dementia may limit her progress secondary to not remembering to perform her HEP. Does well in the clinic and follows directions approprietly.  Pt will continue to benefit from skilled PT interventions to address current functional deficits and impairments.     Plan Goals: SHORT TERM GOALS: Time for Goal Achievement: 3 weeks    1.  Patient to be compliant with HEP.   2.  Patient will be able to walk  on level surfaces for 10 minutes with a normal gait pattern  3.  Patient will be able to achieve heel strike with improved knee extension to allow for improved stabilization when walking   4.  Patient will have 50% less difficutly when climbing up/down stairs     LONG TERM GOALS: Time for Goal Achievement: 6 weeks  1.  Patient will be independent in performing current HEP and understand progress ion of the program  2.  Patient will have pain free functional knee AROM  3.  Patient will be able to walk on level surfaces/inclines and up/down stairs with minimal to no increase in symptoms   4.  Pt will be able to walk for light community activities to include Taoist, restaurant, grocery without loss of balance or increase in symptoms        Plan: Progress to/Continue with current program. Full flight of stairs      Timed:  Manual Therapy:            10_    mins  54003;  Ultrasound:                     0      mins  97976;   Therapeutic Exercise:    25     mins  51170;     Neuromuscular Mikael:   15     mins  28012;    Therapeutic Activity:      0     mins  78662;      Iontophoresis              _0__   mins  Dry Needling               _0____   mins         Untimed:  Work Conditioning: __0__ mins 14989  Electrical Stimulation:    0    mins  19324 ( );  Mechanical Traction:       0        mins  49007;   Paraffin                       __0___  mins   Ice/Heat: __0__ mins      Timed Treatment:  50    mins   Total Treatment:     50   mins          MEIR Garcia License # [id number]  Physical Therapist Assistant

## 2025-06-18 ENCOUNTER — TREATMENT (OUTPATIENT)
Dept: PHYSICAL THERAPY | Facility: CLINIC | Age: 75
End: 2025-06-18
Payer: MEDICARE

## 2025-06-18 DIAGNOSIS — M25.661 DECREASED RANGE OF MOTION OF RIGHT KNEE: ICD-10-CM

## 2025-06-18 DIAGNOSIS — Z74.09 IMPAIRED FUNCTIONAL MOBILITY AND ACTIVITY TOLERANCE: ICD-10-CM

## 2025-06-18 DIAGNOSIS — M25.661 KNEE STIFFNESS, RIGHT: Primary | ICD-10-CM

## 2025-06-22 NOTE — PROGRESS NOTES
Physical Therapy Daily Treatment Note  6/23/2025  Visit Diagnoses:    ICD-10-CM ICD-9-CM   1. Knee stiffness, right  M25.661 719.56   2. Decreased range of motion of right knee  M25.661 719.56   3. Impaired functional mobility and activity tolerance  Z74.09 V49.89       VISIT#: 3      Regina Chavez reports: Her knee is still the same - stiff. She is planning on having surgery.   Current Pain Level:    0/10; Worst:   0/10; Best:  0/10  Affected Area: stiffness in right>left knee, feels more stiffness in anterior than posterior knee   Quality of Pain: tight and pressure   Functional Deficits/Irritating Factors: carrying objects, walking, pushing, uncomfortable because of pain, standing, stooping and unable to perform repetitive tasks   Progression: no significant changes  Compliance with HEP Reported: She walks around the house about 2 hours a day and uses a weight machine.     Objective  Presents: Walks with short stride length, valgus position of right knee, does not achieve full knee extension with heel strike, L knee/hip in ER   Increased sets/reps of:  SLR, Hip abduction   Increased resistance on:  sidestepping  Added to Program: tandem stance on airex, Standing knee flexion      PROM R knee extension = lacking 2 deg    See Exercise, Manual, and Modality Logs for complete treatment.     Patient Education: Pt was educated on exercise biomechanical correctness, intensity, and speed.     Assessment:  Yanique is doing well with her rehab. She does continue with knee stiffness but her strength and balance are improving. She was able to perform modified tandem stance on Airex today with minimal assist from the walls. Able to increase overall exercise volume. Two goals met. Pt will continue to benefit from skilled PT interventions to address current functional deficits and impairments.     SHORT TERM GOALS: Time for Goal Achievement: 3 weeks    1.  Patient to be compliant with HEP.   2.  Patient will be able to walk on  level surfaces for 10 minutes with a normal gait pattern - MET  3.  Patient will be able to achieve heel strike with improved knee extension to allow for improved stabilization when walking - MET   4.  Patient will have 50% less difficutly when climbing up/down stairs     LONG TERM GOALS: Time for Goal Achievement: 6 weeks  1.  Patient will be independent in performing current HEP and understand progress ion of the program  2.  Patient will have pain free functional knee AROM  3.  Patient will be able to walk on level surfaces/inclines and up/down stairs with minimal to no increase in symptoms   4.  Pt will be able to walk for light community activities to include [employer], restaurant, grocery without loss of balance or increase in symptoms       Plan: Progress to/Continue with current program. STAIRS      Timed:  Manual Therapy:            0_    mins  51463;  Ultrasound:                     0      mins  18905;   Therapeutic Exercise:    20     mins  23568;     Neuromuscular Mikael:   10     mins  02399;    Therapeutic Activity:      10     mins  56624;      Iontophoresis              _0__   mins  Dry Needling               _0____   mins         Untimed:  Work Conditioning: __0__ mins 58662  Electrical Stimulation:    0    mins  69127 ( );  Mechanical Traction:       0        mins  96220;   Paraffin                       __0___  mins   Ice/Heat: __0__ mins      Timed Treatment:   40   mins   Total Treatment:     40   mins          MEIR Garcia License # [id number]  Physical Therapist Assistant

## 2025-06-23 ENCOUNTER — TREATMENT (OUTPATIENT)
Dept: PHYSICAL THERAPY | Facility: CLINIC | Age: 75
End: 2025-06-23
Payer: MEDICARE

## 2025-06-23 DIAGNOSIS — Z74.09 IMPAIRED FUNCTIONAL MOBILITY AND ACTIVITY TOLERANCE: ICD-10-CM

## 2025-06-23 DIAGNOSIS — M25.661 DECREASED RANGE OF MOTION OF RIGHT KNEE: ICD-10-CM

## 2025-06-23 DIAGNOSIS — M25.661 KNEE STIFFNESS, RIGHT: Primary | ICD-10-CM

## 2025-06-23 PROCEDURE — 97110 THERAPEUTIC EXERCISES: CPT | Performed by: PHYSICAL THERAPIST

## 2025-06-23 PROCEDURE — 97112 NEUROMUSCULAR REEDUCATION: CPT | Performed by: PHYSICAL THERAPIST

## 2025-06-23 PROCEDURE — 97530 THERAPEUTIC ACTIVITIES: CPT | Performed by: PHYSICAL THERAPIST

## 2025-06-30 ENCOUNTER — TREATMENT (OUTPATIENT)
Dept: PHYSICAL THERAPY | Facility: CLINIC | Age: 75
End: 2025-06-30
Payer: MEDICARE

## 2025-06-30 DIAGNOSIS — Z74.09 IMPAIRED FUNCTIONAL MOBILITY AND ACTIVITY TOLERANCE: ICD-10-CM

## 2025-06-30 DIAGNOSIS — M25.661 DECREASED RANGE OF MOTION OF RIGHT KNEE: ICD-10-CM

## 2025-06-30 DIAGNOSIS — M25.661 KNEE STIFFNESS, RIGHT: Primary | ICD-10-CM

## 2025-06-30 NOTE — PROGRESS NOTES
Physical Therapy Daily Treatment Note  [facility] Physical Therapy [facility]  [address], [address]  [place] [address]    Patient: Regina Chavez   : 1950  Referring practitioner: Nathanael Shahid MD  Today's Date: 2025  Patient seen for 4 sessions    Visit Diagnoses:    ICD-10-CM ICD-9-CM   1. Knee stiffness, right  M25.661 719.56   2. Decreased range of motion of right knee  M25.661 719.56   3. Impaired functional mobility and activity tolerance  Z74.09 V49.89       Subjective   Regina Chavez reports: that the knee just feels stiff.  Does not really have any pain to report.  States that she walks for a couple of hours everyday around the house.   NO sense of instability or locking up but just feels stiff.       Objective   Presents walking with bilateral bent knee gait pattern wearing volleyball knee pads as she insists on wearing them each time she is up walking    Heel slide right 12* - 131*,    left 3* - 148*    See Exercise, Manual, and Modality Logs for complete treatment.     Patient Education: need to try to walk with improved heel strike to increase her activity extension    Assessment/Plan  Patient reports anterior knee impingement with efforts of terminal extension.   Has actually lost some knee extension since starting therapy.      Progress strengthening /stabilization /functional activity           Timed:  Manual Therapy:    10     mins  38211;  Therapeutic Exercise:    20/30     mins  46480;     Neuromuscular Mikael:    10    mins  94826;    Therapeutic Activity:     0     mins  89974;     Ultrasound:      0     mins  96523;    Iontophoresis              __0_   mins        Untimed:  Electrical Stimulation:     0    mins  30741 ( );  Mechanical Traction:             mins  65963;   Paraffin                       _____  mins   Dry Needling               _____   mins      Timed Treatment:   40   mins   Total Treatment:     60   mins    Gretchen Lara, PT  [place]  License # [id number]  Physical Therapist    Electronically signed by Gretchen Lara, PT, 06/30/25, 10:02 AM EDT

## 2025-07-07 ENCOUNTER — TREATMENT (OUTPATIENT)
Dept: PHYSICAL THERAPY | Facility: CLINIC | Age: 75
End: 2025-07-07
Payer: MEDICARE

## 2025-07-07 DIAGNOSIS — M25.661 DECREASED RANGE OF MOTION OF RIGHT KNEE: ICD-10-CM

## 2025-07-07 DIAGNOSIS — Z74.09 IMPAIRED FUNCTIONAL MOBILITY AND ACTIVITY TOLERANCE: ICD-10-CM

## 2025-07-07 DIAGNOSIS — M25.661 KNEE STIFFNESS, RIGHT: Primary | ICD-10-CM

## 2025-07-14 ENCOUNTER — TELEPHONE (OUTPATIENT)
Dept: PHYSICAL THERAPY | Facility: CLINIC | Age: 75
End: 2025-07-14

## 2025-07-15 NOTE — PROGRESS NOTES
Physical Therapy Daily Treatment Note/DC Summary  7/16/2025  Visit Diagnoses:    ICD-10-CM ICD-9-CM   1. Knee stiffness, right  M25.661 719.56   2. Decreased range of motion of right knee  M25.661 719.56   3. Impaired functional mobility and activity tolerance  Z74.09 V49.89       VISIT#: 6      Regina Chavez reports: She reports no changes. She can walk up to two hours a day without pain - just stiffness. There is no change in her stiffness.   Affected Area: stiffness in right>left knee, feels more stiffness in anterior than posterior knee      Functional Deficits/Irritating Factors: carrying objects, walking, pushing, uncomfortable because of pain, standing, stooping and unable to perform repetitive tasks        Objective   R knee flexion = 135 deg; Extension = lacking 1 deg  MMT R knee Flexion = 5; Extension = 5          See Exercise, Manual, and Modality Logs for complete treatment.     Patient Education: Pt was educated on exercise biomechanical correctness, intensity, and speed.     Assessment:  Yanique has met a significant number of her goals but regularly reports no progress. However, her measured strength and range of motion indicate otherwise. She will return to her referring provider for further assessment as wether further medical intervention is necessary.  Pt will continue to benefit from skilled PT interventions to address current functional deficits and impairments.     Plan Goals: SHORT TERM GOALS: Time for Goal Achievement: 3 weeks    1.  Patient to be compliant with HEP. -UNMET  2.  Patient will be able to walk on level surfaces for 10 minutes with a normal gait pattern - MET  3.  Patient will be able to achieve heel strike with improved knee extension to allow for improved stabilization when walking - MET   4.  Patient will have 50% less difficutly when climbing up/down stairs - UNMET Step Two pattern     LONG TERM GOALS: Time for Goal Achievement: 6 weeks  1.  Patient will be independent in  performing current HEP and understand progress ion of the program  UMMET  2.  Patient will have pain free functional knee AROM - MET  3.  Patient will be able to walk on level surfaces/inclines and up/down stairs with minimal to no increase in symptoms - MET  4.  Pt will be able to walk for light community activities to include Scientologist, restaurant, grocery without loss of balance or increase in symptoms MET    Plan: D/C and to return to MD for further evaluation      Timed:  Manual Therapy:            0_    mins  44185;  Ultrasound:                     0      mins  27160;   Therapeutic Exercise:    20     mins  97830;     Neuromuscular Mikael:   10     mins  39074;    Therapeutic Activity:      10     mins  99221;      Iontophoresis              _0__   mins  Dry Needling               _0____   mins         Untimed:  Work Conditioning: __0__ mins 79076  Electrical Stimulation:    0    mins  02465 ( );  Mechanical Traction:       0        mins  40412;   Paraffin                       __0___  mins   Ice/Heat: __0__ mins      Timed Treatment:   40   mins   Total Treatment:     40   mins          MEIR Garcia License # [id number]  Physical Therapist Assistant

## 2025-07-16 ENCOUNTER — TREATMENT (OUTPATIENT)
Dept: PHYSICAL THERAPY | Facility: CLINIC | Age: 75
End: 2025-07-16
Payer: MEDICARE

## 2025-07-16 DIAGNOSIS — Z74.09 IMPAIRED FUNCTIONAL MOBILITY AND ACTIVITY TOLERANCE: ICD-10-CM

## 2025-07-16 DIAGNOSIS — M25.661 DECREASED RANGE OF MOTION OF RIGHT KNEE: ICD-10-CM

## 2025-07-16 DIAGNOSIS — M25.661 KNEE STIFFNESS, RIGHT: Primary | ICD-10-CM

## 2025-07-25 ENCOUNTER — TELEPHONE (OUTPATIENT)
Dept: ORTHOPEDIC SURGERY | Facility: CLINIC | Age: 75
End: 2025-07-25

## 2025-08-11 ENCOUNTER — OFFICE VISIT (OUTPATIENT)
Dept: SLEEP MEDICINE | Facility: HOSPITAL | Age: 75
End: 2025-08-11
Payer: MEDICARE

## 2025-08-11 VITALS
DIASTOLIC BLOOD PRESSURE: 79 MMHG | BODY MASS INDEX: 27.38 KG/M2 | OXYGEN SATURATION: 96 % | HEIGHT: 61 IN | HEART RATE: 103 BPM | WEIGHT: 145 LBS | SYSTOLIC BLOOD PRESSURE: 108 MMHG

## 2025-08-11 DIAGNOSIS — G47.33 OSA (OBSTRUCTIVE SLEEP APNEA): Primary | ICD-10-CM

## 2025-08-11 PROCEDURE — G0463 HOSPITAL OUTPT CLINIC VISIT: HCPCS

## 2025-08-13 ENCOUNTER — TELEPHONE (OUTPATIENT)
Dept: SLEEP MEDICINE | Facility: HOSPITAL | Age: 75
End: 2025-08-13
Payer: MEDICARE

## (undated) DEVICE — NDL SPINE 22G 31/2IN BLK

## (undated) DEVICE — BONE BIOPSY DEVICE F07A TAPERED SIZE 2: Brand: MEDTRONIC REUSABLE INSTRUMENTS

## (undated) DEVICE — APPL CHLORAPREP W/TINT 26ML ORNG

## (undated) DEVICE — CVR EQ IMG 48X27

## (undated) DEVICE — BONE TAMP KIT KEX152EB FF E2 15/2 OI: Brand: KYPHON EXPRESS II KYPHOPAK TRAY

## (undated) DEVICE — MIXER A07A CEMENT

## (undated) DEVICE — SHLD ANGIO 2LAYR CIR FEN

## (undated) DEVICE — GLV SURG BIOGEL LTX PF 7 1/2

## (undated) DEVICE — PK KYPHOPLASTY 40

## (undated) DEVICE — 1010 S-DRAPE TOWEL DRAPE 10/BX: Brand: STERI-DRAPE™

## (undated) DEVICE — SPNG GZ WOVN 4X4IN 12PLY 10/BX STRL

## (undated) DEVICE — GLV SURG BIOGEL LTX PF 8

## (undated) DEVICE — ADHS SKIN DERMABOND TOP ADVANCED

## (undated) DEVICE — CONN TBG Y 5 IN 1 LF STRL

## (undated) DEVICE — GLV SURG BIOGEL LTX PF 8 1/2

## (undated) DEVICE — GOWN,PREVENTION PLUS,XLARGE,STERILE: Brand: MEDLINE

## (undated) DEVICE — SOL ISO/ALC RUB 70PCT 4OZ

## (undated) DEVICE — GLV SURG TRIUMPH CLASSIC PF LTX 8.5 STRL

## (undated) DEVICE — GLV SURG SENSICARE PI LF PF 7.5 GRN STRL